# Patient Record
Sex: FEMALE | Race: WHITE | NOT HISPANIC OR LATINO | Employment: OTHER | URBAN - METROPOLITAN AREA
[De-identification: names, ages, dates, MRNs, and addresses within clinical notes are randomized per-mention and may not be internally consistent; named-entity substitution may affect disease eponyms.]

---

## 2023-03-13 ENCOUNTER — OFFICE VISIT (OUTPATIENT)
Dept: PRIMARY CARE | Facility: CLINIC | Age: 58
End: 2023-03-13
Payer: COMMERCIAL

## 2023-03-13 VITALS
TEMPERATURE: 97.2 F | OXYGEN SATURATION: 96 % | DIASTOLIC BLOOD PRESSURE: 76 MMHG | SYSTOLIC BLOOD PRESSURE: 122 MMHG | HEART RATE: 79 BPM

## 2023-03-13 DIAGNOSIS — J06.9 VIRAL UPPER RESPIRATORY TRACT INFECTION: Primary | ICD-10-CM

## 2023-03-13 PROCEDURE — 99213 OFFICE O/P EST LOW 20 MIN: CPT | Performed by: FAMILY MEDICINE

## 2023-03-13 PROCEDURE — 1036F TOBACCO NON-USER: CPT | Performed by: FAMILY MEDICINE

## 2023-03-13 PROCEDURE — 87635 SARS-COV-2 COVID-19 AMP PRB: CPT

## 2023-03-13 PROCEDURE — U0005 INFEC AGEN DETEC AMPLI PROBE: HCPCS

## 2023-03-13 RX ORDER — PREDNISONE 20 MG/1
40 TABLET ORAL DAILY
Qty: 10 TABLET | Refills: 0 | Status: SHIPPED | OUTPATIENT
Start: 2023-03-13 | End: 2023-03-18

## 2023-03-13 ASSESSMENT — ENCOUNTER SYMPTOMS
HEADACHES: 1
SORE THROAT: 1
FATIGUE: 1

## 2023-03-13 NOTE — PROGRESS NOTES
Subjective   Patient ID: Priya Hoover is a 57 y.o. female who presents for Sore Throat, Headache, Fatigue, and Nasal Congestion (X 4 days).  Sore Throat   Associated symptoms include headaches.   Headache   Associated symptoms include a sore throat.   Fatigue  Associated symptoms include fatigue, headaches and a sore throat.    patient presents with sore throat congestion drainage x4 days.  No fevers.  Has history of COVID in the past but none recently been vaccinated.  Use of Coricidin at home           Review of Systems   Constitutional:  Positive for fatigue.   HENT:  Positive for sore throat.    Neurological:  Positive for headaches.   All other systems reviewed and are negative.      Objective   Physical Exam  Vitals reviewed.   Constitutional:       General: She is not in acute distress.     Appearance: Normal appearance. She is well-developed. She is not diaphoretic.   HENT:      Head: Normocephalic and atraumatic.      Right Ear: Tympanic membrane normal.      Left Ear: Tympanic membrane normal.      Nose: Nose normal.      Mouth/Throat:      Mouth: Mucous membranes are moist.   Eyes:      Pupils: Pupils are equal, round, and reactive to light.   Cardiovascular:      Rate and Rhythm: Normal rate and regular rhythm.      Heart sounds: Normal heart sounds. No murmur heard.     No friction rub. No gallop.   Pulmonary:      Effort: Pulmonary effort is normal.      Breath sounds: Normal breath sounds. No rales.   Abdominal:      General: Bowel sounds are normal.      Palpations: Abdomen is soft.      Tenderness: There is no abdominal tenderness.   Musculoskeletal:      Cervical back: Normal range of motion and neck supple.   Skin:     General: Skin is warm and dry.   Neurological:      Mental Status: She is alert.   Psychiatric:         Mood and Affect: Mood normal.         No visits with results within 2 Month(s) from this visit.   Latest known visit with results is:   Legacy Encounter on 03/05/2021   Component  Date Value Ref Range Status    SARS-COV-2 RESULT 03/05/2021 NOT DETECTED  Not Detected Final    Comment: .  This assay is designed to detect the N, ORF1ab and/or S genes of SARS-CoV-2   via nucleic acid amplification.  A Negative (NOT DETECTED) result does not   preclude 2019-nCoV infection since the adequacy of sample collection and/or   low viral burden may result in presence of viral nucleic acids below the   clinical sensitivity of this test method.  Negative (NOT DETECTED) result   should not be used as the sole basis for treatment or other patient   management decisions. Rather negative results should be combined with   clinical observations, patient history, and epidemiological information to   make patient management decisions.    Fact sheet for providers: https://www.fda.gov/media/864783/download  Fact sheet for patients: https://www.fda.gov/media/542485/download    This test has received FDA Emergency Use Authorization (EUA) and has been   verified by [facility] ([facility]). This test   is only authorized for the duration of time that circumstances                            exist to   justify the authorization of the emergency use of in vitro diagnostic tests   for the detection of SARS-CoV-2 virus and/or diagnosis of COVID-19 infection   under section 564(b)(1) of the Act, 21 U.S.C. 360bbb-3(b)(1), unless the   authorization is terminated or revoked sooner.      [facility] is certified under CLIA-88 as   qualified to perform high complexity testing. Testing is performed in the   [facility] laboratories located at [address] [place] [address].      Date of Symptom Onset? (YYYYMMDD)? 03/05/2021 20,210,303   Final       Assessment/Plan   Problem List Items Addressed This Visit    None  Visit Diagnoses       Viral upper respiratory tract infection    -  Primary    Relevant Medications    predniSONE (Deltasone) 20 mg tablet    Other Relevant  Orders    Sars-CoV-2 PCR, Symptomatic        Sending a burst of prednisone for patient.  Can try Flonase as well.  We will send test for COVID.  If not feeling better on Thursday or Friday can call in for course of amoxicillin

## 2023-03-14 LAB — SARS-COV-2 RESULT: NOT DETECTED

## 2023-03-15 ENCOUNTER — TELEPHONE (OUTPATIENT)
Dept: PRIMARY CARE | Facility: CLINIC | Age: 58
End: 2023-03-15
Payer: COMMERCIAL

## 2023-03-16 ENCOUNTER — TELEPHONE (OUTPATIENT)
Dept: PRIMARY CARE | Facility: CLINIC | Age: 58
End: 2023-03-16
Payer: COMMERCIAL

## 2023-03-16 DIAGNOSIS — J01.00 ACUTE NON-RECURRENT MAXILLARY SINUSITIS: Primary | ICD-10-CM

## 2023-03-16 RX ORDER — AMOXICILLIN 500 MG/1
1000 TABLET, FILM COATED ORAL EVERY 12 HOURS SCHEDULED
Qty: 40 TABLET | Refills: 0 | Status: SHIPPED | OUTPATIENT
Start: 2023-03-16 | End: 2023-03-26

## 2023-03-16 NOTE — TELEPHONE ENCOUNTER
Patient states [facility] said if she is not feeling any better he would send in ABX for her. Patient states she has a bad stomach to watch what is sent in for her. She is using [facility]

## 2025-06-24 ENCOUNTER — ANESTHESIA EVENT (OUTPATIENT)
Dept: OPERATING ROOM | Facility: HOSPITAL | Age: 60
End: 2025-06-24
Payer: COMMERCIAL

## 2025-06-24 ENCOUNTER — APPOINTMENT (OUTPATIENT)
Dept: RADIOLOGY | Facility: HOSPITAL | Age: 60
End: 2025-06-24
Payer: COMMERCIAL

## 2025-06-24 ENCOUNTER — ANESTHESIA (OUTPATIENT)
Dept: OPERATING ROOM | Facility: HOSPITAL | Age: 60
End: 2025-06-24
Payer: COMMERCIAL

## 2025-06-24 ENCOUNTER — APPOINTMENT (OUTPATIENT)
Dept: CARDIOLOGY | Facility: HOSPITAL | Age: 60
End: 2025-06-24
Payer: COMMERCIAL

## 2025-06-24 ENCOUNTER — HOSPITAL ENCOUNTER (OUTPATIENT)
Facility: HOSPITAL | Age: 60
Setting detail: OBSERVATION
Discharge: HOME | End: 2025-06-26
Attending: STUDENT IN AN ORGANIZED HEALTH CARE EDUCATION/TRAINING PROGRAM | Admitting: SURGERY
Payer: COMMERCIAL

## 2025-06-24 DIAGNOSIS — K35.30 ACUTE APPENDICITIS WITH LOCALIZED PERITONITIS, WITHOUT PERFORATION, ABSCESS, OR GANGRENE: Primary | ICD-10-CM

## 2025-06-24 DIAGNOSIS — Z90.49 S/P LAPAROSCOPIC APPENDECTOMY: ICD-10-CM

## 2025-06-24 DIAGNOSIS — K35.80 ACUTE APPENDICITIS, UNSPECIFIED ACUTE APPENDICITIS TYPE: ICD-10-CM

## 2025-06-24 PROBLEM — K35.33 ACUTE APPENDICITIS WITH APPENDICEAL ABSCESS: Status: ACTIVE | Noted: 2025-06-24

## 2025-06-24 LAB
ALBUMIN SERPL BCP-MCNC: 4.8 G/DL (ref 3.4–5)
ALP SERPL-CCNC: 67 U/L (ref 33–110)
ALT SERPL W P-5'-P-CCNC: 25 U/L (ref 7–45)
ANION GAP SERPL CALC-SCNC: 11 MMOL/L (ref 10–20)
APPEARANCE UR: CLEAR
AST SERPL W P-5'-P-CCNC: 21 U/L (ref 9–39)
ATRIAL RATE: 76 BPM
BASOPHILS # BLD AUTO: 0.02 X10*3/UL (ref 0–0.1)
BASOPHILS NFR BLD AUTO: 0.1 %
BILIRUB SERPL-MCNC: 1 MG/DL (ref 0–1.2)
BILIRUB UR STRIP.AUTO-MCNC: NEGATIVE MG/DL
BUN SERPL-MCNC: 15 MG/DL (ref 6–23)
CALCIUM SERPL-MCNC: 9.5 MG/DL (ref 8.6–10.3)
CARDIAC TROPONIN I PNL SERPL HS: <3 NG/L (ref 0–13)
CARDIAC TROPONIN I PNL SERPL HS: <3 NG/L (ref 0–13)
CHLORIDE SERPL-SCNC: 100 MMOL/L (ref 98–107)
CO2 SERPL-SCNC: 29 MMOL/L (ref 21–32)
COLOR UR: ABNORMAL
CREAT SERPL-MCNC: 0.66 MG/DL (ref 0.5–1.05)
EGFRCR SERPLBLD CKD-EPI 2021: >90 ML/MIN/1.73M*2
EOSINOPHIL # BLD AUTO: 0 X10*3/UL (ref 0–0.7)
EOSINOPHIL NFR BLD AUTO: 0 %
ERYTHROCYTE [DISTWIDTH] IN BLOOD BY AUTOMATED COUNT: 12.2 % (ref 11.5–14.5)
GLUCOSE SERPL-MCNC: 128 MG/DL (ref 74–99)
GLUCOSE UR STRIP.AUTO-MCNC: NORMAL MG/DL
HCT VFR BLD AUTO: 44 % (ref 36–46)
HGB BLD-MCNC: 14.3 G/DL (ref 12–16)
IMM GRANULOCYTES # BLD AUTO: 0.04 X10*3/UL (ref 0–0.7)
IMM GRANULOCYTES NFR BLD AUTO: 0.3 % (ref 0–0.9)
KETONES UR STRIP.AUTO-MCNC: ABNORMAL MG/DL
LACTATE SERPL-SCNC: 1.3 MMOL/L (ref 0.4–2)
LEUKOCYTE ESTERASE UR QL STRIP.AUTO: NEGATIVE
LIPASE SERPL-CCNC: 15 U/L (ref 9–82)
LYMPHOCYTES # BLD AUTO: 0.96 X10*3/UL (ref 1.2–4.8)
LYMPHOCYTES NFR BLD AUTO: 6.9 %
MCH RBC QN AUTO: 28.8 PG (ref 26–34)
MCHC RBC AUTO-ENTMCNC: 32.5 G/DL (ref 32–36)
MCV RBC AUTO: 89 FL (ref 80–100)
MONOCYTES # BLD AUTO: 0.41 X10*3/UL (ref 0.1–1)
MONOCYTES NFR BLD AUTO: 2.9 %
NEUTROPHILS # BLD AUTO: 12.55 X10*3/UL (ref 1.2–7.7)
NEUTROPHILS NFR BLD AUTO: 89.8 %
NITRITE UR QL STRIP.AUTO: NEGATIVE
NRBC BLD-RTO: 0 /100 WBCS (ref 0–0)
P AXIS: 60 DEGREES
PH UR STRIP.AUTO: 7.5 [PH]
PLATELET # BLD AUTO: 278 X10*3/UL (ref 150–450)
POTASSIUM SERPL-SCNC: 3.9 MMOL/L (ref 3.5–5.3)
PR INTERVAL: 148 MS
PROT SERPL-MCNC: 7.5 G/DL (ref 6.4–8.2)
PROT UR STRIP.AUTO-MCNC: NEGATIVE MG/DL
Q ONSET: 252 MS
QRS COUNT: 12 BEATS
QRS DURATION: 107 MS
QT INTERVAL: 388 MS
QTC CALCULATION(BAZETT): 437 MS
QTC FREDERICIA: 419 MS
R AXIS: 72 DEGREES
RBC # BLD AUTO: 4.97 X10*6/UL (ref 4–5.2)
RBC # UR STRIP.AUTO: NEGATIVE MG/DL
SODIUM SERPL-SCNC: 136 MMOL/L (ref 136–145)
SP GR UR STRIP.AUTO: 1.04
T AXIS: 49 DEGREES
T OFFSET: 446 MS
UROBILINOGEN UR STRIP.AUTO-MCNC: NORMAL MG/DL
VENTRICULAR RATE: 76 BPM
WBC # BLD AUTO: 14 X10*3/UL (ref 4.4–11.3)

## 2025-06-24 PROCEDURE — 74177 CT ABD & PELVIS W/CONTRAST: CPT

## 2025-06-24 PROCEDURE — 71046 X-RAY EXAM CHEST 2 VIEWS: CPT

## 2025-06-24 PROCEDURE — 88304 TISSUE EXAM BY PATHOLOGIST: CPT | Performed by: PATHOLOGY

## 2025-06-24 PROCEDURE — 36415 COLL VENOUS BLD VENIPUNCTURE: CPT | Performed by: NURSE PRACTITIONER

## 2025-06-24 PROCEDURE — 84484 ASSAY OF TROPONIN QUANT: CPT | Performed by: NURSE PRACTITIONER

## 2025-06-24 PROCEDURE — 96375 TX/PRO/DX INJ NEW DRUG ADDON: CPT | Mod: 59

## 2025-06-24 PROCEDURE — A9698 NON-RAD CONTRAST MATERIALNOC: HCPCS | Performed by: STUDENT IN AN ORGANIZED HEALTH CARE EDUCATION/TRAINING PROGRAM

## 2025-06-24 PROCEDURE — 96372 THER/PROPH/DIAG INJ SC/IM: CPT | Performed by: SURGERY

## 2025-06-24 PROCEDURE — 2500000004 HC RX 250 GENERAL PHARMACY W/ HCPCS (ALT 636 FOR OP/ED): Performed by: NURSE ANESTHETIST, CERTIFIED REGISTERED

## 2025-06-24 PROCEDURE — 2550000001 HC RX 255 CONTRASTS: Performed by: NURSE PRACTITIONER

## 2025-06-24 PROCEDURE — 76705 ECHO EXAM OF ABDOMEN: CPT | Performed by: RADIOLOGY

## 2025-06-24 PROCEDURE — 2500000004 HC RX 250 GENERAL PHARMACY W/ HCPCS (ALT 636 FOR OP/ED): Performed by: SURGERY

## 2025-06-24 PROCEDURE — 74177 CT ABD & PELVIS W/CONTRAST: CPT | Performed by: RADIOLOGY

## 2025-06-24 PROCEDURE — 3700000002 HC GENERAL ANESTHESIA TIME - EACH INCREMENTAL 1 MINUTE: Performed by: SURGERY

## 2025-06-24 PROCEDURE — 76705 ECHO EXAM OF ABDOMEN: CPT

## 2025-06-24 PROCEDURE — 0752T DGTZ GLS MCRSCP SLD LVL III: CPT | Mod: TC,ELYLAB,PORLAB | Performed by: SURGERY

## 2025-06-24 PROCEDURE — 7100000002 HC RECOVERY ROOM TIME - EACH INCREMENTAL 1 MINUTE: Performed by: SURGERY

## 2025-06-24 PROCEDURE — 96361 HYDRATE IV INFUSION ADD-ON: CPT | Mod: 59

## 2025-06-24 PROCEDURE — 84075 ASSAY ALKALINE PHOSPHATASE: CPT | Performed by: NURSE PRACTITIONER

## 2025-06-24 PROCEDURE — 3700000001 HC GENERAL ANESTHESIA TIME - INITIAL BASE CHARGE: Performed by: SURGERY

## 2025-06-24 PROCEDURE — 2720000007 HC OR 272 NO HCPCS: Performed by: SURGERY

## 2025-06-24 PROCEDURE — 83690 ASSAY OF LIPASE: CPT | Performed by: NURSE PRACTITIONER

## 2025-06-24 PROCEDURE — 83605 ASSAY OF LACTIC ACID: CPT | Performed by: NURSE PRACTITIONER

## 2025-06-24 PROCEDURE — 3600000003 HC OR TIME - INITIAL BASE CHARGE - PROCEDURE LEVEL THREE: Performed by: SURGERY

## 2025-06-24 PROCEDURE — 93005 ELECTROCARDIOGRAM TRACING: CPT

## 2025-06-24 PROCEDURE — 3600000008 HC OR TIME - EACH INCREMENTAL 1 MINUTE - PROCEDURE LEVEL THREE: Performed by: SURGERY

## 2025-06-24 PROCEDURE — 2550000001 HC RX 255 CONTRASTS: Performed by: STUDENT IN AN ORGANIZED HEALTH CARE EDUCATION/TRAINING PROGRAM

## 2025-06-24 PROCEDURE — 96365 THER/PROPH/DIAG IV INF INIT: CPT | Mod: 59

## 2025-06-24 PROCEDURE — 2500000004 HC RX 250 GENERAL PHARMACY W/ HCPCS (ALT 636 FOR OP/ED): Performed by: NURSE PRACTITIONER

## 2025-06-24 PROCEDURE — 81003 URINALYSIS AUTO W/O SCOPE: CPT | Performed by: NURSE PRACTITIONER

## 2025-06-24 PROCEDURE — 99285 EMERGENCY DEPT VISIT HI MDM: CPT | Mod: 25 | Performed by: STUDENT IN AN ORGANIZED HEALTH CARE EDUCATION/TRAINING PROGRAM

## 2025-06-24 PROCEDURE — 85025 COMPLETE CBC W/AUTO DIFF WBC: CPT | Performed by: NURSE PRACTITIONER

## 2025-06-24 PROCEDURE — 71046 X-RAY EXAM CHEST 2 VIEWS: CPT | Performed by: RADIOLOGY

## 2025-06-24 PROCEDURE — 7100000001 HC RECOVERY ROOM TIME - INITIAL BASE CHARGE: Performed by: SURGERY

## 2025-06-24 RX ORDER — HYDRALAZINE HYDROCHLORIDE 20 MG/ML
5 INJECTION INTRAMUSCULAR; INTRAVENOUS EVERY 30 MIN PRN
Status: CANCELLED | OUTPATIENT
Start: 2025-06-24

## 2025-06-24 RX ORDER — ENOXAPARIN SODIUM 100 MG/ML
40 INJECTION SUBCUTANEOUS EVERY 24 HOURS
Status: DISCONTINUED | OUTPATIENT
Start: 2025-06-24 | End: 2025-06-26 | Stop reason: HOSPADM

## 2025-06-24 RX ORDER — LIDOCAINE HYDROCHLORIDE 10 MG/ML
0.1 INJECTION, SOLUTION EPIDURAL; INFILTRATION; INTRACAUDAL; PERINEURAL ONCE
Status: CANCELLED | OUTPATIENT
Start: 2025-06-24 | End: 2025-06-24

## 2025-06-24 RX ORDER — MORPHINE SULFATE 4 MG/ML
4 INJECTION INTRAVENOUS ONCE
Status: COMPLETED | OUTPATIENT
Start: 2025-06-24 | End: 2025-06-24

## 2025-06-24 RX ORDER — ONDANSETRON HYDROCHLORIDE 2 MG/ML
4 INJECTION, SOLUTION INTRAVENOUS ONCE AS NEEDED
Status: CANCELLED | OUTPATIENT
Start: 2025-06-24

## 2025-06-24 RX ORDER — ALBUTEROL SULFATE 0.83 MG/ML
2.5 SOLUTION RESPIRATORY (INHALATION) ONCE AS NEEDED
Status: CANCELLED | OUTPATIENT
Start: 2025-06-24

## 2025-06-24 RX ORDER — KETOROLAC TROMETHAMINE 30 MG/ML
INJECTION, SOLUTION INTRAMUSCULAR; INTRAVENOUS AS NEEDED
Status: DISCONTINUED | OUTPATIENT
Start: 2025-06-24 | End: 2025-06-24

## 2025-06-24 RX ORDER — LABETALOL HYDROCHLORIDE 5 MG/ML
5 INJECTION, SOLUTION INTRAVENOUS ONCE AS NEEDED
Status: CANCELLED | OUTPATIENT
Start: 2025-06-24

## 2025-06-24 RX ORDER — MORPHINE SULFATE 2 MG/ML
2 INJECTION, SOLUTION INTRAMUSCULAR; INTRAVENOUS EVERY 5 MIN PRN
Status: CANCELLED | OUTPATIENT
Start: 2025-06-24

## 2025-06-24 RX ORDER — DIPHENHYDRAMINE HYDROCHLORIDE 50 MG/ML
12.5 INJECTION, SOLUTION INTRAMUSCULAR; INTRAVENOUS ONCE AS NEEDED
Status: CANCELLED | OUTPATIENT
Start: 2025-06-24

## 2025-06-24 RX ORDER — PROMETHAZINE HYDROCHLORIDE 25 MG/1
25 TABLET ORAL EVERY 6 HOURS PRN
Status: DISCONTINUED | OUTPATIENT
Start: 2025-06-24 | End: 2025-06-26 | Stop reason: HOSPADM

## 2025-06-24 RX ORDER — ONDANSETRON 4 MG/1
4 TABLET, FILM COATED ORAL EVERY 8 HOURS PRN
Status: DISCONTINUED | OUTPATIENT
Start: 2025-06-24 | End: 2025-06-26 | Stop reason: HOSPADM

## 2025-06-24 RX ORDER — ACETAMINOPHEN 325 MG/1
650 TABLET ORAL EVERY 4 HOURS PRN
Status: DISCONTINUED | OUTPATIENT
Start: 2025-06-24 | End: 2025-06-26 | Stop reason: HOSPADM

## 2025-06-24 RX ORDER — MORPHINE SULFATE 10 MG/ML
2 INJECTION, SOLUTION INTRAMUSCULAR; INTRAVENOUS EVERY 4 HOURS PRN
Status: DISCONTINUED | OUTPATIENT
Start: 2025-06-24 | End: 2025-06-26 | Stop reason: HOSPADM

## 2025-06-24 RX ORDER — ONDANSETRON HYDROCHLORIDE 2 MG/ML
INJECTION, SOLUTION INTRAVENOUS AS NEEDED
Status: DISCONTINUED | OUTPATIENT
Start: 2025-06-24 | End: 2025-06-24

## 2025-06-24 RX ORDER — DEXTROSE, SODIUM CHLORIDE, SODIUM LACTATE, POTASSIUM CHLORIDE, AND CALCIUM CHLORIDE 5; .6; .31; .03; .02 G/100ML; G/100ML; G/100ML; G/100ML; G/100ML
100 INJECTION, SOLUTION INTRAVENOUS CONTINUOUS
Status: ACTIVE | OUTPATIENT
Start: 2025-06-24 | End: 2025-06-25

## 2025-06-24 RX ORDER — KETOROLAC TROMETHAMINE 30 MG/ML
30 INJECTION, SOLUTION INTRAMUSCULAR; INTRAVENOUS ONCE
Status: COMPLETED | OUTPATIENT
Start: 2025-06-24 | End: 2025-06-24

## 2025-06-24 RX ORDER — MEPERIDINE HYDROCHLORIDE 25 MG/ML
12.5 INJECTION INTRAMUSCULAR; INTRAVENOUS; SUBCUTANEOUS EVERY 10 MIN PRN
Status: CANCELLED | OUTPATIENT
Start: 2025-06-24

## 2025-06-24 RX ORDER — PROPOFOL 10 MG/ML
INJECTION, EMULSION INTRAVENOUS AS NEEDED
Status: DISCONTINUED | OUTPATIENT
Start: 2025-06-24 | End: 2025-06-24

## 2025-06-24 RX ORDER — PANTOPRAZOLE SODIUM 40 MG/1
40 TABLET, DELAYED RELEASE ORAL
Status: DISCONTINUED | OUTPATIENT
Start: 2025-06-25 | End: 2025-06-26 | Stop reason: HOSPADM

## 2025-06-24 RX ORDER — SODIUM CHLORIDE, SODIUM LACTATE, POTASSIUM CHLORIDE, CALCIUM CHLORIDE 600; 310; 30; 20 MG/100ML; MG/100ML; MG/100ML; MG/100ML
75 INJECTION, SOLUTION INTRAVENOUS CONTINUOUS
Status: CANCELLED | OUTPATIENT
Start: 2025-06-24 | End: 2025-06-25

## 2025-06-24 RX ORDER — ONDANSETRON HYDROCHLORIDE 2 MG/ML
4 INJECTION, SOLUTION INTRAVENOUS ONCE
Status: COMPLETED | OUTPATIENT
Start: 2025-06-24 | End: 2025-06-24

## 2025-06-24 RX ORDER — SUCCINYLCHOLINE CHLORIDE 20 MG/ML
INJECTION INTRAMUSCULAR; INTRAVENOUS AS NEEDED
Status: DISCONTINUED | OUTPATIENT
Start: 2025-06-24 | End: 2025-06-24

## 2025-06-24 RX ORDER — OXYCODONE AND ACETAMINOPHEN 5; 325 MG/1; MG/1
1 TABLET ORAL EVERY 4 HOURS PRN
Refills: 0 | Status: CANCELLED | OUTPATIENT
Start: 2025-06-24

## 2025-06-24 RX ORDER — DROPERIDOL 2.5 MG/ML
0.62 INJECTION, SOLUTION INTRAMUSCULAR; INTRAVENOUS ONCE AS NEEDED
Status: CANCELLED | OUTPATIENT
Start: 2025-06-24

## 2025-06-24 RX ORDER — BUPIVACAINE HYDROCHLORIDE 2.5 MG/ML
INJECTION, SOLUTION INFILTRATION; PERINEURAL AS NEEDED
Status: DISCONTINUED | OUTPATIENT
Start: 2025-06-24 | End: 2025-06-24 | Stop reason: HOSPADM

## 2025-06-24 RX ORDER — FAMOTIDINE 10 MG/ML
20 INJECTION, SOLUTION INTRAVENOUS ONCE
Status: CANCELLED | OUTPATIENT
Start: 2025-06-24 | End: 2025-06-24

## 2025-06-24 RX ORDER — ROCURONIUM BROMIDE 10 MG/ML
INJECTION, SOLUTION INTRAVENOUS AS NEEDED
Status: DISCONTINUED | OUTPATIENT
Start: 2025-06-24 | End: 2025-06-24

## 2025-06-24 RX ORDER — SODIUM CHLORIDE, SODIUM LACTATE, POTASSIUM CHLORIDE, CALCIUM CHLORIDE 600; 310; 30; 20 MG/100ML; MG/100ML; MG/100ML; MG/100ML
100 INJECTION, SOLUTION INTRAVENOUS CONTINUOUS
Status: CANCELLED | OUTPATIENT
Start: 2025-06-24 | End: 2025-06-25

## 2025-06-24 RX ORDER — PROMETHAZINE HYDROCHLORIDE 25 MG/1
25 SUPPOSITORY RECTAL EVERY 12 HOURS PRN
Status: DISCONTINUED | OUTPATIENT
Start: 2025-06-24 | End: 2025-06-26 | Stop reason: HOSPADM

## 2025-06-24 RX ORDER — OXYCODONE AND ACETAMINOPHEN 5; 325 MG/1; MG/1
1 TABLET ORAL EVERY 6 HOURS PRN
Refills: 0 | Status: DISCONTINUED | OUTPATIENT
Start: 2025-06-24 | End: 2025-06-26 | Stop reason: HOSPADM

## 2025-06-24 RX ORDER — ACETAMINOPHEN 650 MG/1
650 SUPPOSITORY RECTAL EVERY 4 HOURS PRN
Status: DISCONTINUED | OUTPATIENT
Start: 2025-06-24 | End: 2025-06-26 | Stop reason: HOSPADM

## 2025-06-24 RX ORDER — PANTOPRAZOLE SODIUM 40 MG/10ML
40 INJECTION, POWDER, LYOPHILIZED, FOR SOLUTION INTRAVENOUS
Status: DISCONTINUED | OUTPATIENT
Start: 2025-06-25 | End: 2025-06-26 | Stop reason: HOSPADM

## 2025-06-24 RX ORDER — POLYETHYLENE GLYCOL 3350 17 G/17G
17 POWDER, FOR SOLUTION ORAL DAILY
Status: DISCONTINUED | OUTPATIENT
Start: 2025-06-25 | End: 2025-06-26 | Stop reason: HOSPADM

## 2025-06-24 RX ORDER — ONDANSETRON HYDROCHLORIDE 2 MG/ML
4 INJECTION, SOLUTION INTRAVENOUS EVERY 8 HOURS PRN
Status: DISCONTINUED | OUTPATIENT
Start: 2025-06-24 | End: 2025-06-26 | Stop reason: HOSPADM

## 2025-06-24 RX ORDER — ACETAMINOPHEN 160 MG/5ML
650 SOLUTION ORAL EVERY 4 HOURS PRN
Status: DISCONTINUED | OUTPATIENT
Start: 2025-06-24 | End: 2025-06-26 | Stop reason: HOSPADM

## 2025-06-24 RX ADMIN — KETOROLAC TROMETHAMINE 30 MG: 30 INJECTION, SOLUTION INTRAMUSCULAR at 10:46

## 2025-06-24 RX ADMIN — SODIUM CHLORIDE 1000 ML: 0.9 INJECTION, SOLUTION INTRAVENOUS at 10:42

## 2025-06-24 RX ADMIN — IOHEXOL 75 ML: 350 INJECTION, SOLUTION INTRAVENOUS at 11:39

## 2025-06-24 RX ADMIN — ONDANSETRON 8 MG: 2 INJECTION, SOLUTION INTRAMUSCULAR; INTRAVENOUS at 21:08

## 2025-06-24 RX ADMIN — MORPHINE SULFATE 4 MG: 4 INJECTION INTRAVENOUS at 14:21

## 2025-06-24 RX ADMIN — IOHEXOL 75 ML: 350 INJECTION, SOLUTION INTRAVENOUS at 18:17

## 2025-06-24 RX ADMIN — DEXTROSE, SODIUM CHLORIDE, SODIUM LACTATE, POTASSIUM CHLORIDE, AND CALCIUM CHLORIDE 100 ML/HR: 5; .6; .31; .03; .02 INJECTION, SOLUTION INTRAVENOUS at 23:59

## 2025-06-24 RX ADMIN — IOHEXOL 500 ML: 12 SOLUTION ORAL at 15:39

## 2025-06-24 RX ADMIN — DEXAMETHASONE SODIUM PHOSPHATE 4 MG: 4 INJECTION INTRA-ARTICULAR; INTRALESIONAL; INTRAMUSCULAR; INTRAVENOUS; SOFT TISSUE at 20:29

## 2025-06-24 RX ADMIN — SUCCINYLCHOLINE CHLORIDE 100 MG: 20 INJECTION, SOLUTION INTRAMUSCULAR; INTRAVENOUS at 20:29

## 2025-06-24 RX ADMIN — ONDANSETRON 4 MG: 2 INJECTION, SOLUTION INTRAMUSCULAR; INTRAVENOUS at 10:46

## 2025-06-24 RX ADMIN — SODIUM CHLORIDE: 9 INJECTION, SOLUTION INTRAVENOUS at 20:23

## 2025-06-24 RX ADMIN — ENOXAPARIN SODIUM 40 MG: 100 INJECTION SUBCUTANEOUS at 23:59

## 2025-06-24 RX ADMIN — ROCURONIUM BROMIDE 50 MG: 10 INJECTION, SOLUTION INTRAVENOUS at 20:36

## 2025-06-24 RX ADMIN — PIPERACILLIN SODIUM AND TAZOBACTAM SODIUM 3.38 G: 3; .375 INJECTION, SOLUTION INTRAVENOUS at 19:40

## 2025-06-24 RX ADMIN — PROPOFOL 200 MG: 10 INJECTION, EMULSION INTRAVENOUS at 20:29

## 2025-06-24 RX ADMIN — SUGAMMADEX 150 MG: 100 INJECTION, SOLUTION INTRAVENOUS at 21:25

## 2025-06-24 RX ADMIN — KETOROLAC TROMETHAMINE 30 MG: 30 INJECTION, SOLUTION INTRAMUSCULAR; INTRAVENOUS at 21:08

## 2025-06-24 SDOH — SOCIAL STABILITY: SOCIAL INSECURITY: WERE YOU ABLE TO COMPLETE ALL THE BEHAVIORAL HEALTH SCREENINGS?: YES

## 2025-06-24 SDOH — SOCIAL STABILITY: SOCIAL INSECURITY
WITHIN THE LAST YEAR, HAVE YOU BEEN KICKED, HIT, SLAPPED, OR OTHERWISE PHYSICALLY HURT BY YOUR PARTNER OR EX-PARTNER?: NO

## 2025-06-24 SDOH — SOCIAL STABILITY: SOCIAL INSECURITY: WITHIN THE LAST YEAR, HAVE YOU BEEN AFRAID OF YOUR PARTNER OR EX-PARTNER?: NO

## 2025-06-24 SDOH — SOCIAL STABILITY: SOCIAL INSECURITY: WITHIN THE LAST YEAR, HAVE YOU BEEN HUMILIATED OR EMOTIONALLY ABUSED IN OTHER WAYS BY YOUR PARTNER OR EX-PARTNER?: NO

## 2025-06-24 SDOH — ECONOMIC STABILITY: FOOD INSECURITY: WITHIN THE PAST 12 MONTHS, YOU WORRIED THAT YOUR FOOD WOULD RUN OUT BEFORE YOU GOT THE MONEY TO BUY MORE.: NEVER TRUE

## 2025-06-24 SDOH — ECONOMIC STABILITY: INCOME INSECURITY: IN THE PAST 12 MONTHS HAS THE ELECTRIC, GAS, OIL, OR WATER COMPANY THREATENED TO SHUT OFF SERVICES IN YOUR HOME?: NO

## 2025-06-24 SDOH — ECONOMIC STABILITY: FOOD INSECURITY: WITHIN THE PAST 12 MONTHS, THE FOOD YOU BOUGHT JUST DIDN'T LAST AND YOU DIDN'T HAVE MONEY TO GET MORE.: NEVER TRUE

## 2025-06-24 SDOH — SOCIAL STABILITY: SOCIAL INSECURITY: HAVE YOU HAD THOUGHTS OF HARMING ANYONE ELSE?: NO

## 2025-06-24 SDOH — ECONOMIC STABILITY: HOUSING INSECURITY: AT ANY TIME IN THE PAST 12 MONTHS, WERE YOU HOMELESS OR LIVING IN A SHELTER (INCLUDING NOW)?: NO

## 2025-06-24 SDOH — ECONOMIC STABILITY: HOUSING INSECURITY: IN THE LAST 12 MONTHS, WAS THERE A TIME WHEN YOU WERE NOT ABLE TO PAY THE MORTGAGE OR RENT ON TIME?: NO

## 2025-06-24 SDOH — SOCIAL STABILITY: SOCIAL INSECURITY: ABUSE: ADULT

## 2025-06-24 SDOH — ECONOMIC STABILITY: TRANSPORTATION INSECURITY: IN THE PAST 12 MONTHS, HAS LACK OF TRANSPORTATION KEPT YOU FROM MEDICAL APPOINTMENTS OR FROM GETTING MEDICATIONS?: NO

## 2025-06-24 SDOH — ECONOMIC STABILITY: FOOD INSECURITY: HOW HARD IS IT FOR YOU TO PAY FOR THE VERY BASICS LIKE FOOD, HOUSING, MEDICAL CARE, AND HEATING?: NOT HARD AT ALL

## 2025-06-24 SDOH — SOCIAL STABILITY: SOCIAL INSECURITY
WITHIN THE LAST YEAR, HAVE YOU BEEN RAPED OR FORCED TO HAVE ANY KIND OF SEXUAL ACTIVITY BY YOUR PARTNER OR EX-PARTNER?: NO

## 2025-06-24 SDOH — HEALTH STABILITY: MENTAL HEALTH: CURRENT SMOKER: 0

## 2025-06-24 SDOH — ECONOMIC STABILITY: HOUSING INSECURITY: IN THE PAST 12 MONTHS, HOW MANY TIMES HAVE YOU MOVED WHERE YOU WERE LIVING?: 0

## 2025-06-24 ASSESSMENT — COGNITIVE AND FUNCTIONAL STATUS - GENERAL
DAILY ACTIVITIY SCORE: 24
MOBILITY SCORE: 24
MOBILITY SCORE: 24
DAILY ACTIVITIY SCORE: 24
PATIENT BASELINE BEDBOUND: NO

## 2025-06-24 ASSESSMENT — PAIN SCALES - GENERAL
PAINLEVEL_OUTOF10: 10 - WORST POSSIBLE PAIN
PAINLEVEL_OUTOF10: 10 - WORST POSSIBLE PAIN
PAINLEVEL_OUTOF10: 0 - NO PAIN
PAIN_LEVEL: 2
PAINLEVEL_OUTOF10: 0 - NO PAIN
PAINLEVEL_OUTOF10: 0 - NO PAIN
PAINLEVEL_OUTOF10: 10 - WORST POSSIBLE PAIN
PAINLEVEL_OUTOF10: 5 - MODERATE PAIN
PAINLEVEL_OUTOF10: 0 - NO PAIN

## 2025-06-24 ASSESSMENT — LIFESTYLE VARIABLES
HOW OFTEN DO YOU HAVE 6 OR MORE DRINKS ON ONE OCCASION: NEVER
EVER HAD A DRINK FIRST THING IN THE MORNING TO STEADY YOUR NERVES TO GET RID OF A HANGOVER: NO
TOTAL SCORE: 0
HOW MANY STANDARD DRINKS CONTAINING ALCOHOL DO YOU HAVE ON A TYPICAL DAY: PATIENT DOES NOT DRINK
HAVE PEOPLE ANNOYED YOU BY CRITICIZING YOUR DRINKING: NO
EVER FELT BAD OR GUILTY ABOUT YOUR DRINKING: NO
SKIP TO QUESTIONS 9-10: 1
HAVE YOU EVER FELT YOU SHOULD CUT DOWN ON YOUR DRINKING: NO
HOW OFTEN DO YOU HAVE A DRINK CONTAINING ALCOHOL: NEVER
AUDIT-C TOTAL SCORE: 0
AUDIT-C TOTAL SCORE: 0

## 2025-06-24 ASSESSMENT — ACTIVITIES OF DAILY LIVING (ADL)
GROOMING: INDEPENDENT
LACK_OF_TRANSPORTATION: NO
DRESSING YOURSELF: INDEPENDENT
PATIENT'S MEMORY ADEQUATE TO SAFELY COMPLETE DAILY ACTIVITIES?: YES
HEARING - RIGHT EAR: FUNCTIONAL
JUDGMENT_ADEQUATE_SAFELY_COMPLETE_DAILY_ACTIVITIES: YES
TOILETING: INDEPENDENT
ADEQUATE_TO_COMPLETE_ADL: YES
HEARING - LEFT EAR: FUNCTIONAL
WALKS IN HOME: INDEPENDENT
FEEDING YOURSELF: INDEPENDENT
BATHING: INDEPENDENT

## 2025-06-24 ASSESSMENT — PATIENT HEALTH QUESTIONNAIRE - PHQ9
2. FEELING DOWN, DEPRESSED OR HOPELESS: NOT AT ALL
1. LITTLE INTEREST OR PLEASURE IN DOING THINGS: NOT AT ALL
SUM OF ALL RESPONSES TO PHQ9 QUESTIONS 1 & 2: 0

## 2025-06-24 ASSESSMENT — ENCOUNTER SYMPTOMS
SHORTNESS OF BREATH: 0
PALPITATIONS: 0
WHEEZING: 0
ABDOMINAL DISTENTION: 1
FEVER: 0
CONSTIPATION: 0
DYSURIA: 1
ACTIVITY CHANGE: 0
DIFFICULTY URINATING: 0
LIGHT-HEADEDNESS: 0
VOMITING: 0
ABDOMINAL PAIN: 1
NAUSEA: 1
DIARRHEA: 1
APPETITE CHANGE: 0
DIAPHORESIS: 1
CHILLS: 0
CHEST TIGHTNESS: 0
HEMATURIA: 0
DIZZINESS: 0

## 2025-06-24 ASSESSMENT — HEART SCORE
ECG: NORMAL
AGE: 45-64
HEART SCORE: 1
HISTORY: SLIGHTLY SUSPICIOUS
TROPONIN: LESS THAN OR EQUAL TO NORMAL LIMIT
RISK FACTORS: NO KNOWN RISK FACTORS

## 2025-06-24 ASSESSMENT — PAIN - FUNCTIONAL ASSESSMENT
PAIN_FUNCTIONAL_ASSESSMENT: 0-10

## 2025-06-24 NOTE — CONSULTS
Reason For Consult  C/f colitis vs appendicitis    History Of Present Illness  Priya Hoover is a 59 y.o. female with no significant PMH presenting with lower abdominal pain which started around 12-1 am today. She reports the pain coming on suddenly with associated nausea, diarrhea, and cramping. She had a period of diaphoresis but does not know whether she had a fever at the time. She ate dinner last night but has only had a small amount of juice today. When seen, she reports significant improvement to her pain and reports that it's focally worse in the suprapubic region but spans across the entire lower abdomen currently rated as a 4/10. When seen by surgery, she had just received a dose of morphine. She states the pain in crampy in nature. She reports at least 4-5 loose stools since the pain began.  CT imaging is concerning for a dilated appendix to 14 mm with diffuse colitis. Patient is here with her  Tao.      Past Medical History  She has no past medical history on file.    Surgical History  She has no past surgical history on file.     Social History  She reports that she has never smoked. She has never used smokeless tobacco. No history on file for alcohol use and drug use.    Family History  Family History[1]     Allergies  Sulfamethoxazole-trimethoprim    Review of Systems  Review of Systems   Constitutional:  Positive for diaphoresis. Negative for activity change, appetite change, chills and fever.   Respiratory:  Negative for chest tightness, shortness of breath and wheezing.    Cardiovascular:  Negative for chest pain, palpitations and leg swelling.   Gastrointestinal:  Positive for abdominal distention, abdominal pain, diarrhea and nausea. Negative for constipation and vomiting.   Genitourinary:  Positive for dysuria. Negative for difficulty urinating and hematuria.   Neurological:  Negative for dizziness and light-headedness.         Physical Exam  General: NAD, awake and alert, conversive  "  HEENT: Normocephalic, atraumatic  Neuro: A&O x 4, grossly intact  Cardio: Regular rate  Respiratory: Unlabored breathing on RA  Abdomen: Soft, mild distention, tenderness to palpation in lower quadrants - worse in suprapubic region - no rebound or guarding  Extremities: Normal extremities, no edema or cyanosis  Skin: Warm and dry  Psych: Appropriate mood and behavior     Last Recorded Vitals  Blood pressure 124/74, pulse 65, temperature 36.7 °C (98.1 °F), resp. rate 16, height 1.499 m (4' 11\"), weight 52.6 kg (116 lb), SpO2 99%.    Relevant Results  US gallbladder  Result Date: 6/24/2025  Interpreted By:  Orlando Bird, STUDY: US GALLBLADDER;  6/24/2025 11:47 am   INDICATION: Signs/Symptoms:RUQ pain.   COMPARISON: None.   ACCESSION NUMBER(S): [id number]   ORDERING CLINICIAN: DANTE HIRSCH   TECHNIQUE: Multiple images of the right upper quadrant were obtained.  Gray scale, color Doppler and spectral Doppler waveform analysis was performed.   FINDINGS: LIVER: 11.7 cm in length. No focal abnormality. Normal hepatic echogenicity.   GALLBLADDER: No gallstones. No gallbladder wall thickening. Negative sonographic Handy's sign.   BILIARY SYSTEM: Nondilated.   PANCREAS: Visualized portion of the body unremarkable. Remainder obscured by overlying bowel gas.   RIGHT KIDNEY: 10.2 cm in length. No hydronephrosis. No focal renal abnormality.       Negative right upper quadrant ultrasound.   Signed by: Orlando Bird 6/24/2025 12:07 PM Dictation workstation:   [id number]    CT abdomen pelvis w IV contrast  Result Date: 6/24/2025  Interpreted By:  Orlando Bird, STUDY: CT ABDOMEN PELVIS W IV CONTRAST;  6/24/2025 11:51 am   INDICATION: 60 y/o   F with  Signs/Symptoms:abdominal pain.   LIMITATIONS: None.   ACCESSION NUMBER(S): [id number]   ORDERING CLINICIAN: DANTE HIRSCH   TECHNIQUE: After the administration of IV iodonated contrast, spiral axial images were obtained from the xiphoid down through the " symphysis pubis. Sagittal and coronal reconstruction images were generated. 75 mL of Omnipaque 350.   COMPARISON: None.   FINDINGS: Lower Chest: Streaky left basilar atelectasis. Mild distal esophageal thickening near the level of the GE junction, correlate clinically for esophagitis.   Liver: The liver is unremarkable without focal lesion.   Gallbladder and Biliary: Unremarkable.   Pancreas: No abnormality identified in the pancreas.   Spleen: No abnormality identified in the spleen.   Adrenals: No abnormality identified in either adrenal gland.   Urinary: No parenchymal abnormality identified in either kidney. No hydronephrosis.   Gastrointestinal/Peritoneum: No small or large bowel obstruction in the visualized abdomen. In the abdomen, there is no extraluminal air. No significant free fluid. Mild diffuse bowel wall thickening in the colon. There is a structure along the posterior aspect of the cecum distinct from the terminal ileum, which may represent an enlarged appendix, measuring 14 mm in diameter. Gas and stool are seen throughout the structure and there is no wall thickening or adjacent stranding, however this would be considered abnormally enlarged for an appendix.   Vascular: Abdominal aorta is normal in caliber.   Lymphatics: No enlarged lymph nodes by size criteria.   MSK/Body Wall: No aggressive bony lesion identified.       Diffuse bowel wall thickening in the colon suggestive of colitis.   Mild distal esophageal thickening near the level of the GE junction, correlate clinically for esophagitis.   Possibly dilated appendix. Recommend repeating examination with positive oral contrast to exclude early appendicitis.   Signed by: Orlando Bird 6/24/2025 12:07 PM Dictation workstation:   [id number]    XR chest 2 views  Result Date: 6/24/2025  Interpreted By:  Aguila High, STUDY: XR CHEST 2 VIEWS; 6/24/2025 11:11 am   INDICATION: Signs/Symptoms:RUQ pain   COMPARISON: None.   ACCESSION NUMBER(S):  [id number]   ORDERING CLINICIAN: DANTE HIRSCH   TECHNIQUE: Number of films: Two-view radiographs of the chest were obtained.   FINDINGS: The heart and mediastinum are normal. The lungs are clear. No pleural effusions are seen. The osseous structures are unremarkable.       Normal chest radiographs.   Signed by: Aguila High 6/24/2025 11:32 AM Dictation workstation:   [id number]    ECG 12 lead  Result Date: 6/24/2025  Sinus rhythm RSR' in V1 or V2, right VCD or RVH         Assessment/Plan     Ms. Priya Hoover is a 68 y/o female presenting with acute onset lower abdominal pain and CT findings concerning for appendicitis with associated colitis. Radiologist suggests repeat imaging with PO contrast to better visualize the appendix as there is no associated wall thickening or stranding around the structure. Patient with WBC of 14 but otherwise labs unremarkable.     Plan:  - Repeat CT imaging with PO contrast of the abdomen and pelvis  - Dispo pending repeat imaging   - Continue NPO for now with fluids per ED    Patient discussed with attending, Dr. Linda Nuñez MD         [1]   Family History  Problem Relation Name Age of Onset    Hypertension Mother      Hyperlipidemia Mother      Uterine cancer Mother      Hypertension Father      Hyperlipidemia Father      Lung cancer Father      Liver cancer Father      Bone cancer Father      Coronary artery disease Mother's Brother      Aneurysm Mother's Brother  37    Diabetes type II Father's Brother      Coronary artery disease Father's Brother

## 2025-06-24 NOTE — ED PROVIDER NOTES
Chief Complaint   Patient presents with    Abdominal Pain    Nausea    Vomiting    Diarrhea       HPI       59 year old female presents to the Emergency Department today complaining of RUQ abdominal pain that she describes as sharp in nature, constant since midnight, radiates throughout her abdomen, and varies in intensity. Notes to having nausea with diarrhea associated with the above. Denies any associated fever, chills, headache, neck pain, chest pain, shortness of breath, vomiting, constipation, hematemesis, hematochezia, melena, or urinary symptoms.       History provided by:  Patient             Patient History   Medical History[1]  Surgical History[2]  Family History[3]  Social History[4]        Physical Exam  Constitutional:       Appearance: Normal appearance.   HENT:      Head: Normocephalic.      Right Ear: Tympanic membrane, ear canal and external ear normal.      Left Ear: Tympanic membrane, ear canal and external ear normal.      Nose: Nose normal.      Mouth/Throat:      Mouth: Mucous membranes are moist.      Pharynx: Oropharynx is clear. No oropharyngeal exudate or posterior oropharyngeal erythema.   Eyes:      Conjunctiva/sclera: Conjunctivae normal.      Pupils: Pupils are equal, round, and reactive to light.   Cardiovascular:      Rate and Rhythm: Normal rate and regular rhythm.      Pulses:           Radial pulses are 3+ on the right side and 3+ on the left side.        Dorsalis pedis pulses are 3+ on the right side and 3+ on the left side.      Heart sounds: Normal heart sounds. No murmur heard.     No friction rub. No gallop.   Pulmonary:      Effort: Pulmonary effort is normal. No respiratory distress.      Breath sounds: Normal breath sounds. No wheezing, rhonchi or rales.   Abdominal:      General: Abdomen is flat. Bowel sounds are normal.      Palpations: Abdomen is soft.      Tenderness: There is abdominal tenderness in the right lower quadrant. There is no right CVA tenderness, left CVA  tenderness, guarding or rebound. Negative signs include Handy's sign and McBurney's sign.   Musculoskeletal:         General: No swelling or deformity.      Cervical back: Full passive range of motion without pain.      Right lower leg: No edema.      Left lower leg: No edema.   Lymphadenopathy:      Cervical: No cervical adenopathy.   Skin:     Capillary Refill: Capillary refill takes less than 2 seconds.      Coloration: Skin is not jaundiced.      Findings: No rash.   Neurological:      General: No focal deficit present.      Mental Status: She is alert and oriented to person, place, and time. Mental status is at baseline.      Gait: Gait is intact.   Psychiatric:         Mood and Affect: Mood normal.         Behavior: Behavior is cooperative.         Labs Reviewed   CBC WITH AUTO DIFFERENTIAL - Abnormal       Result Value    WBC 14.0 (*)     nRBC 0.0      RBC 4.97      Hemoglobin 14.3      Hematocrit 44.0      MCV 89      MCH 28.8      MCHC 32.5      RDW 12.2      Platelets 278      Neutrophils % 89.8      Immature Granulocytes %, Automated 0.3      Lymphocytes % 6.9      Monocytes % 2.9      Eosinophils % 0.0      Basophils % 0.1      Neutrophils Absolute 12.55 (*)     Immature Granulocytes Absolute, Automated 0.04      Lymphocytes Absolute 0.96 (*)     Monocytes Absolute 0.41      Eosinophils Absolute 0.00      Basophils Absolute 0.02     COMPREHENSIVE METABOLIC PANEL - Abnormal    Glucose 128 (*)     Sodium 136      Potassium 3.9      Chloride 100      Bicarbonate 29      Anion Gap 11      Urea Nitrogen 15      Creatinine 0.66      eGFR >90      Calcium 9.5      Albumin 4.8      Alkaline Phosphatase 67      Total Protein 7.5      AST 21      Bilirubin, Total 1.0      ALT 25     URINALYSIS WITH REFLEX CULTURE AND MICROSCOPIC - Abnormal    Color, Urine Light-Yellow      Appearance, Urine Clear      Specific Gravity, Urine 1.041 (*)     pH, Urine 7.5      Protein, Urine NEGATIVE      Glucose, Urine Normal       Blood, Urine NEGATIVE      Ketones, Urine 20 (1+) (*)     Bilirubin, Urine NEGATIVE      Urobilinogen, Urine Normal      Nitrite, Urine NEGATIVE      Leukocyte Esterase, Urine NEGATIVE     LIPASE - Normal    Lipase 15      Narrative:     Venipuncture immediately after or during the administration of Metamizole may lead to falsely low results. Testing should be performed immediately prior to Metamizole dosing.   LACTATE - Normal    Lactate 1.3      Narrative:     Venipuncture immediately after or during the administration of Metamizole may lead to falsely low results. Testing should be performed immediately prior to Metamizole dosing.   SERIAL TROPONIN-INITIAL - Normal    Troponin I, High Sensitivity <3      Narrative:     Less than 99th percentile of normal range cutoff-  Female and children under 18 years old <14 ng/L; Male <21 ng/L: Negative  Repeat testing should be performed if clinically indicated.     Female and children under 18 years old 14-50 ng/L; Male 21-50 ng/L:  Consistent with possible cardiac damage and possible increased clinical   risk. Serial measurements may help to assess extent of myocardial damage.     >50 ng/L: Consistent with cardiac damage, increased clinical risk and  myocardial infarction. Serial measurements may help assess extent of   myocardial damage.      NOTE: Children less than 1 year old may have higher baseline troponin   levels and results should be interpreted in conjunction with the overall   clinical context.     NOTE: Troponin I testing is performed using a different   testing methodology at [facility] than at other   [facility]. Direct result comparisons should only   be made within the same method.   SERIAL TROPONIN, 1 HOUR - Normal    Troponin I, High Sensitivity <3      Narrative:     Less than 99th percentile of normal range cutoff-  Female and children under 18 years old <14 ng/L; Male <21 ng/L: Negative  Repeat testing should be performed if  clinically indicated.     Female and children under 18 years old 14-50 ng/L; Male 21-50 ng/L:  Consistent with possible cardiac damage and possible increased clinical   risk. Serial measurements may help to assess extent of myocardial damage.     >50 ng/L: Consistent with cardiac damage, increased clinical risk and  myocardial infarction. Serial measurements may help assess extent of   myocardial damage.      NOTE: Children less than 1 year old may have higher baseline troponin   levels and results should be interpreted in conjunction with the overall   clinical context.     NOTE: Troponin I testing is performed using a different   testing methodology at [facility] than at other   [facility]. Direct result comparisons should only   be made within the same method.   TROPONIN SERIES- (INITIAL, 1 HR)    Narrative:     The following orders were created for panel order Troponin I Series, High Sensitivity (0, 1 HR).  Procedure                               Abnormality         Status                     ---------                               -----------         ------                     Troponin I, High Sensiti...[[id number]]  Normal              Final result               Troponin, High Sensitivi...[[id number]]  Normal              Final result                 Please view results for these tests on the individual orders.   URINALYSIS WITH REFLEX CULTURE AND MICROSCOPIC    Narrative:     The following orders were created for panel order Urinalysis with Reflex Culture and Microscopic.  Procedure                               Abnormality         Status                     ---------                               -----------         ------                     Urinalysis with Reflex C...[[id number]]  Abnormal            Final result               Extra Urine Gray Tube[[id number]]                            In process                   Please view results for these tests on the individual orders.   EXTRA URINE  GRAY TUBE       CT abdomen pelvis w IV and oral contrast   Final Result   There is a dilated tubular structure in the right lower quadrant   measuring up to 1.5 cm as noted on prior CT. There is no significant   opacification with oral contrast. This is favored to relate to   dilated appendix with question subtle stranding in the right lower   quadrant. Very early acute appendicitis can not be excluded.   Correlate with physical and laboratory examination. Surgical   consultation is advised.        There is mild wall thickening extending from the mid transverse colon   to the rectosigmoid. This may relate to under distention although   early acute colitis not excluded in the appropriate clinical setting.   No pneumatosis or portal venous gas.        Additional findings as described above.        MACRO:   None        Signed by: Ann Stephens 6/24/2025 6:42 PM   Dictation workstation:   [id number]      CT abdomen pelvis w IV contrast   Final Result   Diffuse bowel wall thickening in the colon suggestive of colitis.        Mild distal esophageal thickening near the level of the GE junction,   correlate clinically for esophagitis.        Possibly dilated appendix. Recommend repeating examination with   positive oral contrast to exclude early appendicitis.        Signed by: Orlando Bird 6/24/2025 12:07 PM   Dictation workstation:   [id number]      US gallbladder   Final Result   Negative right upper quadrant ultrasound.        Signed by: Orlando Bird 6/24/2025 12:07 PM   Dictation workstation:   [id number]      XR chest 2 views   Final Result   Normal chest radiographs.        Signed by: Aguila High 6/24/2025 11:32 AM   Dictation workstation:   [id number]               ED Course & MDM   ED Course as of 06/24/25 2007 Tue Jun 24, 2025   1215 Reviewed the CT results and spoke with Ladarius, surgical resident, who agrees to attend in the ED. Patient aware of the findings and agrees with the above plan of  care.  [JZ]   1511 Following surgery evaluation, they requested that we obtain a CT of her abdomen and pelvis with oral contrast. Patient aware and agreeable with plan.  [JZ]      ED Course User Index  [JZ] Juma Camacho, APRN-CNP         Diagnoses as of 06/24/25 2007   Acute appendicitis, unspecified acute appendicitis type           Medical Decision Making  EKG interpreted by Dr. Michael. Indication: abdominal pain. Findings: NSR with a ventricular rate of 76, normal axis, normal intervals, and no acute ischemic or injury pattern. Impression: No acute pathology.       Patient was seen and evaluated by Dr. Michael. Placed on a cardiac monitor which showed normal sinus rhythm without ectopy throughout ED stay. Rhythm strip obtained showed normal sinus rhythm. Impression: No acute pathology. Continuous pulse oximetry monitoring showed no signs of hypoxia. Saline lock was established with labs drawn and results as above. Given 1 Liter of normal saline wide open over 1 hour. Given Zofran and Toradol as well. After receiving the medications and IV fluids, reported feeling improved. Noted to have an elevated WBC count of 14.0. Remainder of blood counts, electrolytes, kidney function, liver function, lipase, and lactate were unremarkable. Heart Score- 1 with normal EKG and troponin with delta troponin. At this time, we feel that the abdominal pain is atypical for acute coronary syndrome. We find no underlying evidence of an acute infectious process or pneumothorax on CXR. Clinically, we do not feel they are exhibiting signs of pulmonary embolism or thoracic aortic dissection (no connective tissue disorder, no tachycardia, tachypnea, hypoxia, and mediastinum normal in size on CXR). Urinalysis shows no signs of infection. RUQ US shows negative right upper quadrant ultrasound. CT scan of her abdomen and pelvis with contrast shows diffuse bowel wall thickening in the colon suggestive of colitis; mild distal esophageal  thickening near the level of the GE junction, correlate clinically for esophagitis; and possibly dilated appendix- recommend repeating examination with positive oral contrast to exclude early appendicitis. CT scan of her abdomen and pelvis with po contrast shows a dilated tubular structure in the right lower quadrant measuring up to 1.5 cm as noted on prior CT and there is no significant opacification with oral contrast- this is favored to relate to dilated appendix with question subtle stranding in the right lower quadrant and very early acute appendicitis can not be excluded. Dr. Mckeon was notified of the CT results and evaluated the patient in the ED. She plans to take the patient to the OR for an appendectomy. She was treated with Zosyn here. Placed on observation medicine immediately following or initial examination at 1004. Time was used to determine whether the patient was going to be admitted, discharged, or transferred to surgery. When is was determined that the patient was going to go to surgery at 1940, the patient was taken off of observation medicine.     Diagnostic Impression:    1. Acute appendicitis    2. IV meds and fluids in ED     3. Observation medicine               Your medication list      You have not been prescribed any medications.           Procedure  Procedures         [1] History reviewed. No pertinent past medical history.  [2] History reviewed. No pertinent surgical history.  [3]   Family History  Problem Relation Name Age of Onset    Hypertension Mother      Hyperlipidemia Mother      Uterine cancer Mother      Hypertension Father      Hyperlipidemia Father      Lung cancer Father      Liver cancer Father      Bone cancer Father      Coronary artery disease Mother's Brother      Aneurysm Mother's Brother  37    Diabetes type II Father's Brother      Coronary artery disease Father's Brother     [4]   Social History  Tobacco Use    Smoking status: Never    Smokeless tobacco: Never    Substance Use Topics    Alcohol use: Not on file    Drug use: Not on file        Juma Camacho, TYRONE-EMMA  06/24/25 2008

## 2025-06-24 NOTE — H&P
"History Of Present Illness  Priya Hoover is a 59 y.o. female presenting with abd pain.  With ct showing 1.4 cm tubular lesion in RLQ.      Past Medical History  Medical History[1]    Surgical History  Surgical History[2]     Social History  She reports that she has never smoked. She has never used smokeless tobacco. No history on file for alcohol use and drug use.    Family History  Family History[3]     Allergies  Sulfamethoxazole-trimethoprim    Review of Systems     Physical Exam  RRR non labored breathing, tender RLQ     Last Recorded Vitals  Blood pressure 109/78, pulse 99, temperature 36.7 °C (98.1 °F), resp. rate 18, height 1.499 m (4' 11\"), weight 52.6 kg (116 lb), SpO2 98%.    Relevant Results             Assessment & Plan  Acute appendicitis with localized peritonitis, without perforation, abscess, or gangrene      Counseled for surgery.  Pt agreeable to procedure.     I spent 30 minutes in the professional and overall care of this patient.      Trinidad Mckeon MD         [1] History reviewed. No pertinent past medical history.  [2] History reviewed. No pertinent surgical history.  [3]   Family History  Problem Relation Name Age of Onset    Hypertension Mother      Hyperlipidemia Mother      Uterine cancer Mother      Hypertension Father      Hyperlipidemia Father      Lung cancer Father      Liver cancer Father      Bone cancer Father      Coronary artery disease Mother's Brother      Aneurysm Mother's Brother  37    Diabetes type II Father's Brother      Coronary artery disease Father's Brother       "

## 2025-06-25 LAB
ANION GAP SERPL CALC-SCNC: 11 MMOL/L (ref 10–20)
BUN SERPL-MCNC: 10 MG/DL (ref 6–23)
CALCIUM SERPL-MCNC: 8.4 MG/DL (ref 8.6–10.3)
CHLORIDE SERPL-SCNC: 107 MMOL/L (ref 98–107)
CO2 SERPL-SCNC: 23 MMOL/L (ref 21–32)
CREAT SERPL-MCNC: 0.71 MG/DL (ref 0.5–1.05)
EGFRCR SERPLBLD CKD-EPI 2021: >90 ML/MIN/1.73M*2
ERYTHROCYTE [DISTWIDTH] IN BLOOD BY AUTOMATED COUNT: 12.7 % (ref 11.5–14.5)
GLUCOSE SERPL-MCNC: 145 MG/DL (ref 74–99)
HCT VFR BLD AUTO: 34 % (ref 36–46)
HGB BLD-MCNC: 10.9 G/DL (ref 12–16)
HOLD SPECIMEN: NORMAL
MAGNESIUM SERPL-MCNC: 1.95 MG/DL (ref 1.6–2.4)
MCH RBC QN AUTO: 29 PG (ref 26–34)
MCHC RBC AUTO-ENTMCNC: 32.1 G/DL (ref 32–36)
MCV RBC AUTO: 90 FL (ref 80–100)
NRBC BLD-RTO: 0 /100 WBCS (ref 0–0)
PLATELET # BLD AUTO: 217 X10*3/UL (ref 150–450)
POTASSIUM SERPL-SCNC: 4.1 MMOL/L (ref 3.5–5.3)
RBC # BLD AUTO: 3.76 X10*6/UL (ref 4–5.2)
SODIUM SERPL-SCNC: 137 MMOL/L (ref 136–145)
WBC # BLD AUTO: 16.5 X10*3/UL (ref 4.4–11.3)

## 2025-06-25 PROCEDURE — 83735 ASSAY OF MAGNESIUM: CPT | Performed by: SURGERY

## 2025-06-25 PROCEDURE — 2500000004 HC RX 250 GENERAL PHARMACY W/ HCPCS (ALT 636 FOR OP/ED): Performed by: SURGERY

## 2025-06-25 PROCEDURE — 80048 BASIC METABOLIC PNL TOTAL CA: CPT | Performed by: SURGERY

## 2025-06-25 PROCEDURE — 85027 COMPLETE CBC AUTOMATED: CPT | Performed by: SURGERY

## 2025-06-25 PROCEDURE — 96366 THER/PROPH/DIAG IV INF ADDON: CPT | Mod: 59

## 2025-06-25 PROCEDURE — 2500000004 HC RX 250 GENERAL PHARMACY W/ HCPCS (ALT 636 FOR OP/ED)

## 2025-06-25 PROCEDURE — 96376 TX/PRO/DX INJ SAME DRUG ADON: CPT | Mod: 59

## 2025-06-25 PROCEDURE — 2500000004 HC RX 250 GENERAL PHARMACY W/ HCPCS (ALT 636 FOR OP/ED): Mod: JZ

## 2025-06-25 PROCEDURE — 2500000001 HC RX 250 WO HCPCS SELF ADMINISTERED DRUGS (ALT 637 FOR MEDICARE OP): Performed by: SURGERY

## 2025-06-25 PROCEDURE — G0378 HOSPITAL OBSERVATION PER HR: HCPCS

## 2025-06-25 PROCEDURE — 96372 THER/PROPH/DIAG INJ SC/IM: CPT | Performed by: SURGERY

## 2025-06-25 PROCEDURE — 36415 COLL VENOUS BLD VENIPUNCTURE: CPT | Performed by: SURGERY

## 2025-06-25 PROCEDURE — 96361 HYDRATE IV INFUSION ADD-ON: CPT | Mod: 59

## 2025-06-25 RX ORDER — KETOROLAC TROMETHAMINE 30 MG/ML
30 INJECTION, SOLUTION INTRAMUSCULAR; INTRAVENOUS ONCE
Status: COMPLETED | OUTPATIENT
Start: 2025-06-25 | End: 2025-06-25

## 2025-06-25 RX ADMIN — POLYETHYLENE GLYCOL 3350 17 G: 17 POWDER, FOR SOLUTION ORAL at 07:51

## 2025-06-25 RX ADMIN — PIPERACILLIN SODIUM AND TAZOBACTAM SODIUM 3.38 G: 3; .375 INJECTION, SOLUTION INTRAVENOUS at 02:02

## 2025-06-25 RX ADMIN — PANTOPRAZOLE SODIUM 40 MG: 40 TABLET, DELAYED RELEASE ORAL at 06:47

## 2025-06-25 RX ADMIN — KETOROLAC TROMETHAMINE 30 MG: 30 INJECTION, SOLUTION INTRAMUSCULAR at 17:19

## 2025-06-25 RX ADMIN — ENOXAPARIN SODIUM 40 MG: 100 INJECTION SUBCUTANEOUS at 20:23

## 2025-06-25 RX ADMIN — PIPERACILLIN SODIUM AND TAZOBACTAM SODIUM 3.38 G: 3; .375 INJECTION, SOLUTION INTRAVENOUS at 20:23

## 2025-06-25 RX ADMIN — OXYCODONE HYDROCHLORIDE AND ACETAMINOPHEN 1 TABLET: 5; 325 TABLET ORAL at 20:31

## 2025-06-25 RX ADMIN — PIPERACILLIN SODIUM AND TAZOBACTAM SODIUM 3.38 G: 3; .375 INJECTION, SOLUTION INTRAVENOUS at 13:26

## 2025-06-25 RX ADMIN — OXYCODONE HYDROCHLORIDE AND ACETAMINOPHEN 1 TABLET: 5; 325 TABLET ORAL at 11:53

## 2025-06-25 RX ADMIN — PIPERACILLIN SODIUM AND TAZOBACTAM SODIUM 3.38 G: 3; .375 INJECTION, SOLUTION INTRAVENOUS at 07:51

## 2025-06-25 ASSESSMENT — COGNITIVE AND FUNCTIONAL STATUS - GENERAL
DAILY ACTIVITIY SCORE: 24
WALKING IN HOSPITAL ROOM: A LITTLE
WALKING IN HOSPITAL ROOM: A LITTLE
MOBILITY SCORE: 22
CLIMB 3 TO 5 STEPS WITH RAILING: A LITTLE
DAILY ACTIVITIY SCORE: 24
CLIMB 3 TO 5 STEPS WITH RAILING: A LITTLE
MOBILITY SCORE: 22

## 2025-06-25 ASSESSMENT — PAIN SCALES - GENERAL
PAINLEVEL_OUTOF10: 4
PAINLEVEL_OUTOF10: 0 - NO PAIN
PAINLEVEL_OUTOF10: 6
PAINLEVEL_OUTOF10: 0 - NO PAIN
PAINLEVEL_OUTOF10: 0 - NO PAIN
PAINLEVEL_OUTOF10: 7
PAINLEVEL_OUTOF10: 2
PAINLEVEL_OUTOF10: 0 - NO PAIN

## 2025-06-25 ASSESSMENT — PAIN DESCRIPTION - LOCATION
LOCATION: ABDOMEN

## 2025-06-25 ASSESSMENT — PAIN - FUNCTIONAL ASSESSMENT
PAIN_FUNCTIONAL_ASSESSMENT: 0-10

## 2025-06-25 ASSESSMENT — PAIN DESCRIPTION - DESCRIPTORS
DESCRIPTORS: STABBING
DESCRIPTORS: ACHING;DISCOMFORT
DESCRIPTORS: ACHING;DISCOMFORT

## 2025-06-25 ASSESSMENT — PAIN DESCRIPTION - ORIENTATION
ORIENTATION: LOWER
ORIENTATION: LOWER

## 2025-06-25 NOTE — ANESTHESIA PROCEDURE NOTES
Airway  Date/Time: 6/24/2025 8:29 PM  Reason: elective    Airway not difficult    Staffing  Performed: CRNA   Authorized by: RICO Nielsen    Performed by: RICO Nielsen  Patient location during procedure: OR    Patient Condition  Indications for airway management: anesthesia  Patient position: sniffing  Planned trial extubation  Sedation level: deep     Final Airway Details   Preoxygenated: yes  Final airway type: endotracheal airway  Successful airway: ETT  Cuffed: yes   Successful intubation technique: video laryngoscopy  Adjuncts used in placement: intubating stylet  Endotracheal tube insertion site: oral  Blade: Reshma  Blade size: #3  ETT size (mm): 7.0  Cormack-Lehane Classification: grade I - full view of glottis  Placement verified by: chest auscultation and capnometry   Measured from: lips  ETT to lips (cm): 22  Number of attempts at approach: 1  Number of other approaches attempted: 0

## 2025-06-25 NOTE — CARE PLAN
Problem: Pain - Adult  Goal: Verbalizes/displays adequate comfort level or baseline comfort level  Outcome: Progressing  Flowsheets (Taken 6/25/2025 0146)  Verbalizes/displays adequate comfort level or baseline comfort level:   Assess pain using appropriate pain scale   Administer analgesics based on type and severity of pain and evaluate response   Encourage patient to monitor pain and request assistance   Implement non-pharmacological measures as appropriate and evaluate response   Consider cultural and social influences on pain and pain management   Notify Licensed Independent Practitioner if interventions unsuccessful or patient reports new pain     Problem: Chronic Conditions and Co-morbidities  Goal: Patient's chronic conditions and co-morbidity symptoms are monitored and maintained or improved  Outcome: Progressing  Flowsheets (Taken 6/25/2025 0146)  Care Plan - Patient's Chronic Conditions and Co-Morbidity Symptoms are Monitored and Maintained or Improved:   Monitor and assess patient's chronic conditions and comorbid symptoms for stability, deterioration, or improvement   Collaborate with multidisciplinary team to address chronic and comorbid conditions and prevent exacerbation or deterioration   Update acute care plan with appropriate goals if chronic or comorbid symptoms are exacerbated and prevent overall improvement and discharge

## 2025-06-25 NOTE — OP NOTE
APPENDECTOMY, LAPAROSCOPIC Operative Note     Date: 2025  OR Location: [facility]    Name: Priya Hoover, : 1965, Age: 59 y.o., MRN: [id number], Sex: female    Diagnosis  Pre-op Diagnosis      * Acute appendicitis with localized peritonitis, without perforation, abscess, or gangrene [K35.30] Post-op Diagnosis     * Acute appendicitis with localized peritonitis, without perforation, abscess, or gangrene [K35.30]     Procedures  APPENDECTOMY, LAPAROSCOPIC  17824 - [place] LAPAROSCOPIC APPENDECTOMY      Surgeons      * Trinidad Mckeon - Primary    Resident/Fellow/Other Assistant:  Surgeons and Role:  * No surgeons found with a matching role *    Staff:   Circulator: Jordy  Surgical Assistant: Bailey  Circulator: Phoebe Garza Person: Stefanie    Anesthesia Staff: CRNA: RICO Nielsen    Procedure Summary  Anesthesia: Anesthesia type not filed in the log.  ASA: II  Estimated Blood Loss: 2 mL  Intra-op Medications:   Administrations occurring from  to  on 25:   Medication Name Total Dose   BUPivacaine HCl (Marcaine) 0.25 % (2.5 mg/mL) injection 6 mL   ketorolac (Toradol) 30 mg 30 mg   ondansetron 2 mg/mL 8 mg   rocuronium (ZeMuron) 50 mg/5 mL injection 50 mg              Anesthesia Record               Intraprocedure I/O Totals       None           Specimen:   ID Type Source Tests Collected by Time   1 : APPENDIX Tissue APPENDIX SURGICAL PATHOLOGY EXAM Trinidad Mckeon MD 2025                 Drains and/or Catheters:   Urethral Catheter Non-latex 16 Fr. (Active)       Tourniquet Times:         Implants:     Findings: Acutely inflamed appendix     Indications: Priya Hoover is an 59 y.o. female who is having surgery for Acute appendicitis with localized peritonitis, without perforation, abscess, or gangrene [K35.30].     The patient was seen in the preoperative area. The risks, benefits, complications, treatment options, non-operative alternatives, expected recovery and outcomes were  discussed with the patient. The possibilities of reaction to medication, pulmonary aspiration, injury to surrounding structures, bleeding, recurrent infection, the need for additional procedures, failure to diagnose a condition, and creating a complication requiring transfusion or operation were discussed with the patient. The patient concurred with the proposed plan, giving informed consent.  The site of surgery was properly noted/marked if necessary per policy. The patient has been actively warmed in preoperative area. Preoperative antibiotics have been ordered and given within 1 hours of incision. Venous thrombosis prophylaxis have been ordered including bilateral sequential compression devices    Procedure Details: ndication for surgery:  Pt with right lower quadrant pain X 1 days.  Counseled for Lap appy possible open agreeable to proceed.     Patient was taken operating room placed in supine position general anesthesia was administered with endotracheal intubated abdomen was prepped and draped in sterile fashion.  The area was localized below the umbilicus.  The incision was made carried down sharply skin and subcutaneous tissue to the fascia the fascia was opened the abdomen was entered.  O Vicryl stay sutures were placed on either side of the fascia.  The Hagan trocar was inserted abdomen was insufflated to 15 cm CO2 patient was placed in Trendelenburg and airplane to the left.  1 left lower quadrant port was placed under direct vision and one suprapubic port was also placed under direct vision the appendix was visualized the base of the appendix was isolated.  Transected with the Endo NARENDRA stapler.  The mesoappendix was similarly isolated.  Transected with the vascular Endo NARENDRA stapler.  An small area still attached the appendix this was transected after being clipped on both sides. The  abdomen was irrigated.  The appendix is placed in an Endo Catch bag and brought out through the umbilical port site.  The  ports were removed under direct vision there was bleeding noted from the sites this was controlled with electrocautery and then over sewn with 0-0 Vycril in figure of eight fashion from both port sites.  The 0 Vicryl stay sutures had been placed and the fascia were tied in a pursestring fashion after the abdomen was desufflated skin was closed with 4-0 Vicryl in a subcuticular fashion.  Steri-Strips dressings were applied patient was awakened and extubated taken to PACU in stable condition.  Evidence of Infection: Yes; Purulent fluid Below the level of the fascia (organ/space)  Complications:  None; patient tolerated the procedure well.    Disposition: PACU - hemodynamically stable.  Condition: stable         Task Performed by RNFA or Surgical Assistant:  Assisted with surgery and patient preparation.           Additional Details:     Attending Attestation: A qualified resident physician was not available.    Trinidad Mckeon  Phone Number: [phone]

## 2025-06-25 NOTE — CARE PLAN
The patient's goals for the shift include        Problem: Pain - Adult  Goal: Verbalizes/displays adequate comfort level or baseline comfort level  Outcome: Progressing     Problem: Safety - Adult  Goal: Free from fall injury  Outcome: Progressing     Problem: Discharge Planning  Goal: Discharge to home or other facility with appropriate resources  Outcome: Progressing     Problem: Chronic Conditions and Co-morbidities  Goal: Patient's chronic conditions and co-morbidity symptoms are monitored and maintained or improved  Outcome: Progressing     Problem: Nutrition  Goal: Nutrient intake appropriate for maintaining nutritional needs  Outcome: Progressing     Problem: Fall/Injury  Goal: Not fall by end of shift  Outcome: Progressing  Goal: Be free from injury by end of the shift  Outcome: Progressing  Goal: Verbalize understanding of personal risk factors for fall in the hospital  Outcome: Progressing  Goal: Verbalize understanding of risk factor reduction measures to prevent injury from fall in the home  Outcome: Progressing  Goal: Use assistive devices by end of the shift  Outcome: Progressing  Goal: Pace activities to prevent fatigue by end of the shift  Outcome: Progressing     Problem: Skin  Goal: Promote/optimize nutrition  Outcome: Progressing  Flowsheets (Taken 6/25/2025 0719)  Promote/optimize nutrition: Monitor/record intake including meals  Goal: Promote skin healing  Outcome: Progressing  Flowsheets (Taken 6/25/2025 0719)  Promote skin healing:   Ensure correct size (line/device) and apply per  instructions   Rotate device position/do not position patient on device

## 2025-06-25 NOTE — ANESTHESIA PREPROCEDURE EVALUATION
Patient: Priya Hoover    Procedure Information       Date/Time: 06/24/25 2030    Procedure: APPENDECTOMY, LAPAROSCOPIC    Location: [facility] / [facility]    Surgeons: Trinidad Mckeon MD            Relevant Problems   Anesthesia (within normal limits)       Clinical information reviewed:   Tobacco  Allergies  Meds   Med Hx  Surg Hx   Fam Hx  Soc Hx        NPO Detail:  No data recorded     Physical Exam    Airway  Mallampati: II  TM distance: >3 FB  Neck ROM: full  Mouth opening: 3 or more finger widths     Cardiovascular - normal exam   Dental - normal exam     Pulmonary - normal exam   Abdominal - normal exam           Anesthesia Plan    History of general anesthesia?: yes  History of complications of general anesthesia?: no    ASA 2     general     The patient is not a current smoker.    intravenous induction   Postoperative pain plan includes opioids.  Anesthetic plan and risks discussed with patient.    Plan discussed with CRNA.

## 2025-06-25 NOTE — PROGRESS NOTES
"Priya Hoover is a 59 y.o. female on day 0 of admission presenting with Acute appendicitis with localized peritonitis, without perforation, abscess, or gangrene.    Subjective   Patient to OR overnight for laparoscopic appendectomy. Vitals stable and pain well controlled. Patient reports some intermittent spasms in lower abdomen that last for 15 seconds. Denies fever, chills, chest pain, SOB, nausea and dysuria.        Objective     Physical Exam  Constitutional:       Appearance: Normal appearance.   HENT:      Head: Normocephalic and atraumatic.      Mouth/Throat:      Mouth: Mucous membranes are moist.   Eyes:      General: No scleral icterus.     Extraocular Movements: Extraocular movements intact.   Cardiovascular:      Rate and Rhythm: Normal rate.   Pulmonary:      Effort: Pulmonary effort is normal. No respiratory distress.      Comments: Demonstrates 1000 mL on IS  Abdominal:      General: There is no distension.      Palpations: Abdomen is soft.      Tenderness: There is abdominal tenderness. There is no guarding or rebound.      Comments: Appropriate tenderness to palpation without rebound or guarding. 3 incisions covered with gauze and tegederm with no notable drainage.    Musculoskeletal:         General: No swelling. Normal range of motion.      Cervical back: Normal range of motion.   Skin:     General: Skin is warm and dry.      Capillary Refill: Capillary refill takes 2 to 3 seconds.   Neurological:      General: No focal deficit present.      Mental Status: She is alert and oriented to person, place, and time.   Psychiatric:         Mood and Affect: Mood normal.         Behavior: Behavior normal.         Last Recorded Vitals  Blood pressure 109/68, pulse 57, temperature 36.9 °C (98.4 °F), temperature source Temporal, resp. rate 16, height 1.499 m (4' 11\"), weight 52.6 kg (116 lb), SpO2 98%.  Intake/Output last 3 Shifts:  I/O last 3 completed shifts:  In: 2150 (40.9 mL/kg) [P.O.:250; I.V.:900 (17.1 " mL/kg); IV Piggyback:1000]  Out: 151 (2.9 mL/kg) [Urine:151 (0.1 mL/kg/hr)]  Weight: 52.6 kg     Relevant Results  CT abdomen pelvis w IV and oral contrast  Result Date: 6/24/2025  Interpreted By:  Ann Stephens, STUDY: CT ABDOMEN PELVIS W IV AND ORAL CONTRAST;  6/24/2025 6:27 pm   INDICATION: Signs/Symptoms:Possible dilation of the appendix on prior CT- recommended po contrast study per radiologist.   COMPARISON: CT scan of the abdomen pelvis 06/24/2025   ACCESSION NUMBER(S): [id number]   ORDERING CLINICIAN: DANTE HIRSCH   TECHNIQUE: Axial CT images of the abdomen and pelvis with coronal and sagittal reconstructed images obtained after intravenous administration of 75 mL of Omnipaque 350.   FINDINGS: LOWER CHEST: No acute abnormality of the lung bases.   ABDOMEN:   LIVER: Within normal limits. BILE DUCTS: Normal caliber. GALLBLADDER: No calcified gallstones. No wall thickening. Layering hyperdense material in the gallbladder likely relates to vicarious excretion of contrast. PANCREAS: Within normal limits. SPLEEN: Within normal limits. ADRENALS: Within normal limits. KIDNEYS and URETERS: Symmetric renal enhancement. No hydronephrosis or perinephric fluid collection. Contrast within the collecting system likely relates to prior CT.   VESSELS:   No aortic aneurysm. RETROPERITONEUM: No pathologically enlarged retroperitoneal lymph nodes.   PELVIS:   REPRODUCTIVE ORGANS: Uterus is present. No adnexal mass. Prominent parametrial vessels noted. BLADDER: Bladder is partially distended. Contrast within the bladder causes beam hardening artifact slightly limiting evaluation.   BOWEL: Stomach is underdistended. Visualized loops of bowel are without evidence for obstruction. There is a dilated tubular structure in the right lower quadrant measuring up to 1.5 cm as noted on prior CT. There is no significant opacification with oral contrast. This is favored to relate to dilated appendix with question subtle stranding in  the right lower quadrant early acute appendicitis can not be excluded. There is mild wall thickening extending from the mid transverse colon to the rectosigmoid. This may relate to under distention although early acute colitis not excluded. No pneumatosis or portal venous gas. PERITONEUM: No ascites or free air, no fluid collection.   ABDOMINAL WALL: Within normal limits. BONES: Degenerative changes of the spine.       There is a dilated tubular structure in the right lower quadrant measuring up to 1.5 cm as noted on prior CT. There is no significant opacification with oral contrast. This is favored to relate to dilated appendix with question subtle stranding in the right lower quadrant. Very early acute appendicitis can not be excluded. Correlate with physical and laboratory examination. Surgical consultation is advised.   There is mild wall thickening extending from the mid transverse colon to the rectosigmoid. This may relate to under distention although early acute colitis not excluded in the appropriate clinical setting. No pneumatosis or portal venous gas.   Additional findings as described above.   MACRO: None   Signed by: Ann Stephens 6/24/2025 6:42 PM Dictation workstation:   [id number]    US gallbladder  Result Date: 6/24/2025  Interpreted By:  Orladno Bird, STUDY: US GALLBLADDER;  6/24/2025 11:47 am   INDICATION: Signs/Symptoms:RUQ pain.   COMPARISON: None.   ACCESSION NUMBER(S): [id number]   ORDERING CLINICIAN: DANTE HIRSCH   TECHNIQUE: Multiple images of the right upper quadrant were obtained.  Gray scale, color Doppler and spectral Doppler waveform analysis was performed.   FINDINGS: LIVER: 11.7 cm in length. No focal abnormality. Normal hepatic echogenicity.   GALLBLADDER: No gallstones. No gallbladder wall thickening. Negative sonographic Handy's sign.   BILIARY SYSTEM: Nondilated.   PANCREAS: Visualized portion of the body unremarkable. Remainder obscured by overlying bowel gas.    RIGHT KIDNEY: 10.2 cm in length. No hydronephrosis. No focal renal abnormality.       Negative right upper quadrant ultrasound.   Signed by: Orlando Bird 6/24/2025 12:07 PM Dictation workstation:   [id number]    CT abdomen pelvis w IV contrast  Result Date: 6/24/2025  Interpreted By:  Orlando Bird, STUDY: CT ABDOMEN PELVIS W IV CONTRAST;  6/24/2025 11:51 am   INDICATION: 58 y/o   F with  Signs/Symptoms:abdominal pain.   LIMITATIONS: None.   ACCESSION NUMBER(S): [id number]   ORDERING CLINICIAN: DANTE HIRSCH   TECHNIQUE: After the administration of IV iodonated contrast, spiral axial images were obtained from the xiphoid down through the symphysis pubis. Sagittal and coronal reconstruction images were generated. 75 mL of Omnipaque 350.   COMPARISON: None.   FINDINGS: Lower Chest: Streaky left basilar atelectasis. Mild distal esophageal thickening near the level of the GE junction, correlate clinically for esophagitis.   Liver: The liver is unremarkable without focal lesion.   Gallbladder and Biliary: Unremarkable.   Pancreas: No abnormality identified in the pancreas.   Spleen: No abnormality identified in the spleen.   Adrenals: No abnormality identified in either adrenal gland.   Urinary: No parenchymal abnormality identified in either kidney. No hydronephrosis.   Gastrointestinal/Peritoneum: No small or large bowel obstruction in the visualized abdomen. In the abdomen, there is no extraluminal air. No significant free fluid. Mild diffuse bowel wall thickening in the colon. There is a structure along the posterior aspect of the cecum distinct from the terminal ileum, which may represent an enlarged appendix, measuring 14 mm in diameter. Gas and stool are seen throughout the structure and there is no wall thickening or adjacent stranding, however this would be considered abnormally enlarged for an appendix.   Vascular: Abdominal aorta is normal in caliber.   Lymphatics: No enlarged lymph nodes by size  criteria.   MSK/Body Wall: No aggressive bony lesion identified.       Diffuse bowel wall thickening in the colon suggestive of colitis.   Mild distal esophageal thickening near the level of the GE junction, correlate clinically for esophagitis.   Possibly dilated appendix. Recommend repeating examination with positive oral contrast to exclude early appendicitis.   Signed by: Orlando Bird 6/24/2025 12:07 PM Dictation workstation:   [id number]    XR chest 2 views  Result Date: 6/24/2025  Interpreted By:  Aguila High, STUDY: XR CHEST 2 VIEWS; 6/24/2025 11:11 am   INDICATION: Signs/Symptoms:RUQ pain   COMPARISON: None.   ACCESSION NUMBER(S): [id number]   ORDERING CLINICIAN: DANTE HIRSCH   TECHNIQUE: Number of films: Two-view radiographs of the chest were obtained.   FINDINGS: The heart and mediastinum are normal. The lungs are clear. No pleural effusions are seen. The osseous structures are unremarkable.       Normal chest radiographs.   Signed by: Aguila High 6/24/2025 11:32 AM Dictation workstation:   [id number]    ECG 12 lead  Result Date: 6/24/2025  Sinus rhythm RSR' in V1 or V2, right VCD or RVH      Assessment & Plan  Acute appendicitis with localized peritonitis, without perforation, abscess, or gangrene    Acute appendicitis with appendiceal abscess    Ms. Priya Hoover is a 60 y/o female s/p laparoscopic appendectomy on 6/24/25    Plan:  - Advance to regular diet  - PRN pain control  - PRN Zofran  - Discontinue IV fluids  - Leukocytosis to 16.5 - likely reactive s/p surgery. Will continue antibiotics for 24 hours postoperatively  - Encourage ambulation  - Likely discharge 6/26/25    Patient seen and discussed with attending, Dr. Linda Nuñez MD  PGY 2 General Surgery

## 2025-06-25 NOTE — CARE PLAN
Problem: Pain - Adult  Goal: Verbalizes/displays adequate comfort level or baseline comfort level  Outcome: Progressing  Flowsheets (Taken 6/25/2025 1950)  Verbalizes/displays adequate comfort level or baseline comfort level:   Encourage patient to monitor pain and request assistance   Assess pain using appropriate pain scale   Implement non-pharmacological measures as appropriate and evaluate response   Consider cultural and social influences on pain and pain management   Notify Licensed Independent Practitioner if interventions unsuccessful or patient reports new pain   Administer analgesics based on type and severity of pain and evaluate response     Problem: Chronic Conditions and Co-morbidities  Goal: Patient's chronic conditions and co-morbidity symptoms are monitored and maintained or improved  Outcome: Progressing  Flowsheets (Taken 6/25/2025 1950)  Care Plan - Patient's Chronic Conditions and Co-Morbidity Symptoms are Monitored and Maintained or Improved:   Monitor and assess patient's chronic conditions and comorbid symptoms for stability, deterioration, or improvement   Collaborate with multidisciplinary team to address chronic and comorbid conditions and prevent exacerbation or deterioration   Update acute care plan with appropriate goals if chronic or comorbid symptoms are exacerbated and prevent overall improvement and discharge

## 2025-06-25 NOTE — PROGRESS NOTES
06/25/25 1619   Discharge Planning   Living Arrangements Spouse/significant other   Support Systems Spouse/significant other   Assistance Needed N/A   Type of Residence Private residence   Home or Post Acute Services None   Expected Discharge Disposition Home   Does the patient need discharge transport arranged? No   Intensity of Service   Intensity of Service 0-30 min     Patient admitted from home with spouse. She is able to manage her own care at baseline, performs ADLS/IADLS by self at baseline. PCP is Tod Topete. She was admitted for acute appendicitis, she did undergo lap appe with Dr Mckeon. She is under observation status. Patient to return home when she is medically ready. TCC to follow if needs should arise

## 2025-06-26 ENCOUNTER — PHARMACY VISIT (OUTPATIENT)
Dept: PHARMACY | Facility: CLINIC | Age: 60
End: 2025-06-26
Payer: COMMERCIAL

## 2025-06-26 VITALS
DIASTOLIC BLOOD PRESSURE: 57 MMHG | BODY MASS INDEX: 23.39 KG/M2 | OXYGEN SATURATION: 97 % | RESPIRATION RATE: 18 BRPM | SYSTOLIC BLOOD PRESSURE: 95 MMHG | WEIGHT: 116 LBS | HEIGHT: 59 IN | TEMPERATURE: 97.8 F | HEART RATE: 74 BPM

## 2025-06-26 LAB
ANION GAP SERPL CALC-SCNC: 9 MMOL/L (ref 10–20)
BASOPHILS # BLD AUTO: 0.02 X10*3/UL (ref 0–0.1)
BASOPHILS NFR BLD AUTO: 0.2 %
BUN SERPL-MCNC: 13 MG/DL (ref 6–23)
CALCIUM SERPL-MCNC: 8.6 MG/DL (ref 8.6–10.3)
CHLORIDE SERPL-SCNC: 107 MMOL/L (ref 98–107)
CO2 SERPL-SCNC: 26 MMOL/L (ref 21–32)
CREAT SERPL-MCNC: 0.93 MG/DL (ref 0.5–1.05)
EGFRCR SERPLBLD CKD-EPI 2021: 71 ML/MIN/1.73M*2
EOSINOPHIL # BLD AUTO: 0.07 X10*3/UL (ref 0–0.7)
EOSINOPHIL NFR BLD AUTO: 0.8 %
ERYTHROCYTE [DISTWIDTH] IN BLOOD BY AUTOMATED COUNT: 12.6 % (ref 11.5–14.5)
GLUCOSE SERPL-MCNC: 89 MG/DL (ref 74–99)
HCT VFR BLD AUTO: 33.1 % (ref 36–46)
HGB BLD-MCNC: 11 G/DL (ref 12–16)
IMM GRANULOCYTES # BLD AUTO: 0.04 X10*3/UL (ref 0–0.7)
IMM GRANULOCYTES NFR BLD AUTO: 0.4 % (ref 0–0.9)
LYMPHOCYTES # BLD AUTO: 3.07 X10*3/UL (ref 1.2–4.8)
LYMPHOCYTES NFR BLD AUTO: 33.2 %
MCH RBC QN AUTO: 29.6 PG (ref 26–34)
MCHC RBC AUTO-ENTMCNC: 33.2 G/DL (ref 32–36)
MCV RBC AUTO: 89 FL (ref 80–100)
MONOCYTES # BLD AUTO: 0.8 X10*3/UL (ref 0.1–1)
MONOCYTES NFR BLD AUTO: 8.6 %
NEUTROPHILS # BLD AUTO: 5.26 X10*3/UL (ref 1.2–7.7)
NEUTROPHILS NFR BLD AUTO: 56.8 %
NRBC BLD-RTO: 0 /100 WBCS (ref 0–0)
PLATELET # BLD AUTO: 201 X10*3/UL (ref 150–450)
POTASSIUM SERPL-SCNC: 3.5 MMOL/L (ref 3.5–5.3)
RBC # BLD AUTO: 3.71 X10*6/UL (ref 4–5.2)
SODIUM SERPL-SCNC: 138 MMOL/L (ref 136–145)
WBC # BLD AUTO: 9.3 X10*3/UL (ref 4.4–11.3)

## 2025-06-26 PROCEDURE — G0378 HOSPITAL OBSERVATION PER HR: HCPCS

## 2025-06-26 PROCEDURE — 80048 BASIC METABOLIC PNL TOTAL CA: CPT

## 2025-06-26 PROCEDURE — 2500000001 HC RX 250 WO HCPCS SELF ADMINISTERED DRUGS (ALT 637 FOR MEDICARE OP): Performed by: SURGERY

## 2025-06-26 PROCEDURE — 2500000004 HC RX 250 GENERAL PHARMACY W/ HCPCS (ALT 636 FOR OP/ED): Performed by: SURGERY

## 2025-06-26 PROCEDURE — RXMED WILLOW AMBULATORY MEDICATION CHARGE

## 2025-06-26 PROCEDURE — 36415 COLL VENOUS BLD VENIPUNCTURE: CPT

## 2025-06-26 PROCEDURE — 85025 COMPLETE CBC W/AUTO DIFF WBC: CPT

## 2025-06-26 RX ORDER — OXYCODONE AND ACETAMINOPHEN 5; 325 MG/1; MG/1
1 TABLET ORAL EVERY 6 HOURS PRN
Qty: 12 TABLET | Refills: 0 | Status: SHIPPED | OUTPATIENT
Start: 2025-06-26 | End: 2025-06-29

## 2025-06-26 RX ORDER — POLYETHYLENE GLYCOL 3350 17 G/17G
17 POWDER, FOR SOLUTION ORAL DAILY
Qty: 119 G | Refills: 0 | Status: SHIPPED | OUTPATIENT
Start: 2025-06-26

## 2025-06-26 RX ADMIN — ACETAMINOPHEN 650 MG: 325 TABLET ORAL at 00:52

## 2025-06-26 RX ADMIN — PANTOPRAZOLE SODIUM 40 MG: 40 TABLET, DELAYED RELEASE ORAL at 06:44

## 2025-06-26 RX ADMIN — OXYCODONE HYDROCHLORIDE AND ACETAMINOPHEN 1 TABLET: 5; 325 TABLET ORAL at 06:44

## 2025-06-26 RX ADMIN — POLYETHYLENE GLYCOL 3350 17 G: 17 POWDER, FOR SOLUTION ORAL at 09:02

## 2025-06-26 ASSESSMENT — COGNITIVE AND FUNCTIONAL STATUS - GENERAL
DRESSING REGULAR LOWER BODY CLOTHING: A LITTLE
MOBILITY SCORE: 22
DAILY ACTIVITIY SCORE: 23
WALKING IN HOSPITAL ROOM: A LITTLE
CLIMB 3 TO 5 STEPS WITH RAILING: A LITTLE

## 2025-06-26 ASSESSMENT — PAIN SCALES - GENERAL
PAINLEVEL_OUTOF10: 3
PAINLEVEL_OUTOF10: 1
PAINLEVEL_OUTOF10: 0 - NO PAIN
PAINLEVEL_OUTOF10: 6

## 2025-06-26 ASSESSMENT — PAIN - FUNCTIONAL ASSESSMENT
PAIN_FUNCTIONAL_ASSESSMENT: 0-10

## 2025-06-26 NOTE — DISCHARGE SUMMARY
Discharge Diagnosis  Acute appendicitis with localized peritonitis, without perforation, abscess, or gangrene    Issues Requiring Follow-Up  Post operative follow up with Dr. Mckeon    Test Results Pending At Discharge  Pending Labs       Order Current Status    Surgical Pathology Exam In process            Hospital Course   Ms. Priya Hoover is a 60 y/o female with no significant PMH who presented to the ED with acute abdominal pain and associated nausea and vomiting. She was found on CT imaging to have acute appendicitis and taken the OR for laparoscopic appendectomy on 6/24/25. She tolerated the procedure well but did have some spasms in the lower abdomen and ability to tolerate oral intake was minimal until evening of 6/25/25. Patient was ambulating well with normalization of WBC on 6/26/25. Her pain was well controlled and she tolerated multiple meals of a regular diet. She is medically stable for discharge home on 6/26/25 with follow up with Dr. Mckeon in 1 week.     Visit Vitals  BP 92/59 (BP Location: Left arm, Patient Position: Lying) Comment: RN notified   Pulse 58   Temp 36.2 °C (97.2 °F) (Temporal)   Resp 17     Vitals:    06/24/25 0959   Weight: 52.6 kg (116 lb)       Immunization History   Administered Date(s) Administered    Tdap vaccine, age 7 year and older (BOOSTRIX, ADACEL) 07/18/2019       Results        Pertinent Physical Exam At Time of Discharge  Physical Exam  Constitutional:       Appearance: Normal appearance.   HENT:      Head: Normocephalic and atraumatic.      Mouth/Throat:      Mouth: Mucous membranes are moist.   Eyes:      General: No scleral icterus.     Extraocular Movements: Extraocular movements intact.   Cardiovascular:      Rate and Rhythm: Normal rate.   Pulmonary:      Effort: Pulmonary effort is normal. No respiratory distress.   Abdominal:      General: Abdomen is flat. There is no distension.      Palpations: Abdomen is soft.      Tenderness: There is abdominal  H&P- [employer] 1978, 36 y o  male MRN: [id number]    Unit/Bed#: E5 -01 Encounter: [id number]    Primary Care Provider: Ngoc Chavira   Date and time admitted to hospital: 12/22/2018 10:06 AM  History and Physical - [facility]    Patient Information: [employer] 36 y o  male MRN: [id number]  Unit/Bed#: E5 -01 Encounter: [id number]  Admitting Physician: Tangela Benavides PA-C  PCP: Ngoc Chavira  Date of Admission:  12/22/18    Assessment/Plan:    Hospital Problem List:     Principal Problem:    Sepsis [facility])  Active Problems: Moderate asthma with exacerbation    HIV (human immunodeficiency virus infection) ([facility] [address] )          * [facility] )   Assessment & Plan    2* URI w/asthma exacerbation vs bronchitis  In pt w/HIV  By tachycardia tachypnea with initial lactic acid 2 2 now 1 8  CXR negative for pneumonia  Received Levaquin in the ED Will continue for now check procalcitonin as well as influenza by pcr  Follow-up blood cultures monitor vital signs for fever curve     Moderate asthma with exacerbation   Assessment & Plan    Likely secondary viral URI versus bronchitis    Consider atypical etiology given HIV  Patient room air with good respiratory excursion but diffuse expiratory polyphonic wheezes  Patient maintained on Qvar and albuterol inhaler with acute exacerbation  Continue IV solumedrol 40mg q 8 h, RTC updraft nebulizer therapies       HIV (human immunodeficiency virus infection) ([facility] )   Assessment & Plan    Followed by ID in [employer]  Just started on [place] has been compliant w/meds  Reports 2 mo ago cd4 was 81406, unable to recall viral load  F/u cd4 and viral load draw, if found to have AIDS consider ID consultation and possible IRS           VTE Prophylaxis: Enoxaparin (Lovenox)  / sequential compression device   Code Status: fc  POLST: There is no POLST form on file for this patient (pre-hospital)    Anticipated Length of Stay:  Patient will be admitted on an Inpatient basis with an anticipated length of stay of  Greater than 2 midnights  Justification for Hospital Stay: asthma w/acute exacerbation, hiv    Total Time for Visit, including Counseling / Coordination of Care: 45 minutes  Greater than 50% of this total time spent on direct patient counseling and coordination of care  Chief Complaint:   Sob/ cough/ congestion for 5 days    History of Present Illness:    Elias Aguirre is a 36 y o  male who presents with PMH of HIV, asthma, current quarter pack per day smoker coming for shortness of breath cough sinus congestion last days  Patient reports that the week prior to admission he received a Gardasil injection  He also was started on Biktarvy from his ID doctor in [facility] where he lived up until 08/2018 when he moved up to the [place]  He reports that starting Tuesday while he is at work he started developed sinus congestion, postnasal drip, sore throat, cough productive of white/tan sputum, with worsening shortness of breath  The patient reports he normally takes Qvar and albuterol for his asthma  He is being started on new medication but he does not recall the name of this  He reports over the last 2 days his sob started to ramp up where he was having dyspnea on exertion lasting upward to 10 minutes  The day prior to admission he had pnd  He has myalgias, and chills, ha and nausea  No known sick contacts  No travel in last 3-6 mo  His last cd4 ct was 48480 by his recall and does not remember what his viral load was  He is compliant with his ART  He has been hospitalized 2x/in last year for asthma  Review of Systems:    Review of Systems   Constitutional: Positive for chills  Negative for fever  HENT: Positive for congestion and sneezing  Respiratory: Positive for cough and shortness of breath  Musculoskeletal: Positive for myalgias  Neurological: Positive for headaches  All other systems reviewed and are negative        Past tenderness. There is no guarding or rebound.      Comments: Appropriate tenderness to palpation postoperatively. Three inciscions covered with gauze and tegederm with minimal drainage.    Musculoskeletal:         General: Normal range of motion.      Cervical back: Normal range of motion.   Skin:     General: Skin is warm and dry.      Capillary Refill: Capillary refill takes less than 2 seconds.   Neurological:      General: No focal deficit present.      Mental Status: She is alert and oriented to person, place, and time.   Psychiatric:         Mood and Affect: Mood normal.         Behavior: Behavior normal.         Home Medications     Medication List      START taking these medications     oxyCODONE-acetaminophen 5-325 mg tablet; Commonly known as: Percocet;   Take 1 tablet by mouth every 6 hours if needed for severe pain (7 - 10)   for up to 3 days.   polyethylene glycol 17 gram packet; Commonly known as: Glycolax,   Miralax; Take 17 g by mouth once daily for 7 days.       Outpatient Follow-Up  No future appointments.    Ladarius Nuñez MD   Medical and Surgical History:     Past Medical History:   Diagnosis Date    Asthma     HIV (human immunodeficiency virus infection) ([facility] [address] )        History reviewed  No pertinent surgical history  Meds/Allergies:    Prior to Admission medications    Medication Sig Start Date End Date Taking? Authorizing Provider   albuterol (PROAIR HFA) 90 mcg/act inhaler Inhale 2 puffs every 4 (four) hours as needed for wheezing 12/20/18  Yes Tawanda Lopez MD   predniSONE 20 mg tablet Take 3 tablets (60 mg total) by mouth daily for 5 days 12/20/18 12/25/18 Yes Tawanda Lopez MD   Zidovudine (RETROVIR PO) Take by mouth daily   Yes Historical Provider, MD     I have reviewed home medications with patient personally  Allergies: Allergies   Allergen Reactions    Penicillins Itching       Social History:     Marital Status: Single   Occupation:   Patient Pre-hospital Living Situation:   Patient Pre-hospital Level of Mobility:   Patient Pre-hospital Diet Restrictions:   Substance Use History:   History   Alcohol Use No     History   Smoking Status    Current Every Day Smoker    Types: Cigarettes   Smokeless Tobacco    Never Used     History   Drug Use No       Family History:    History reviewed  No pertinent family history  Physical Exam:     Vitals:   Blood Pressure: 148/80 (12/22/18 1245)  Pulse: 94 (12/22/18 1245)  Temperature: 98 7 °F (37 1 °C) (12/22/18 1245)  Temp Source: Temporal (12/22/18 1245)  Respirations: 20 (12/22/18 1245)  Weight - Scale: 110 kg (241 lb 10 oz) (12/22/18 1010)  SpO2: 95 % (12/22/18 1245)    Physical Exam   Constitutional: He appears well-developed  No distress  Appears obese, appears ill   HENT:   Head: Normocephalic and atraumatic  Right Ear: External ear normal    No posterior pharyngeal exudate or erythema no tonsillar exudate   Eyes: Conjunctivae are normal  Right eye exhibits no discharge  Left eye exhibits no discharge  No scleral icterus     Cardiovascular: Normal rate and regular rhythm  Exam reveals no gallop and no friction rub  No murmur heard  Pulmonary/Chest: No respiratory distress  He has wheezes  He has no rales  Patient on room air with minimal conversational dyspnea and semi recumbent position    Does demonstrate diffuse polyphonic expiratory wheezing on anterior and posterior auscultation   Abdominal: He exhibits no distension  There is no tenderness  There is no rebound and no guarding  Musculoskeletal: He exhibits no edema  Lymphadenopathy:     He has no cervical adenopathy  Neurological: He is alert  Skin: Skin is warm and dry  He is not diaphoretic  Psychiatric: He has a normal mood and affect  Vitals reviewed  (  Be Sure to Include Physical Exam: Delete this entire line when you have entered your exam)    Additional Data:     Lab Results: I have personally reviewed pertinent reports  Results from last 7 days  Lab Units 12/22/18  1033   WBC Thousand/uL 9 38   HEMOGLOBIN g/dL 16 0   HEMATOCRIT % 50 4*   PLATELETS Thousands/uL 258   NEUTROS PCT % 78*   LYMPHS PCT % 14   MONOS PCT % 7   EOS PCT % 0       Results from last 7 days  Lab Units 12/22/18  1033   POTASSIUM mmol/L 3 9   CHLORIDE mmol/L 103   CO2 mmol/L 25   BUN mg/dL 15   CREATININE mg/dL 1 22   CALCIUM mg/dL 9 7           Imaging: I have personally reviewed pertinent reports  Xr Chest 2 Views    Result Date: 12/22/2018  Narrative: CHEST INDICATION:   cough  Shortness of breath, asthma COMPARISON:  12/20/2018 chest x-ray EXAM PERFORMED/VIEWS:  XR CHEST PA & LATERAL Images: 4, dual-energy FINDINGS: Cardiomediastinal silhouette appears unremarkable  The lungs are clear  No pneumothorax or pleural effusion  Osseous structures appear within normal limits for patient age  Impression: No acute cardiopulmonary disease  Stable exam Workstation performed: [id number]     Xr Chest 2 Views    Result Date: 12/20/2018  Narrative: CHEST INDICATION:   Shortness of breath  2 days of cough  COMPARISON:  9/4/2018 EXAM PERFORMED/VIEWS:  XR CHEST PA & LATERAL  The frontal view was performed utilizing dual energy radiographic technique  Images: 4 FINDINGS: Cardiomediastinal silhouette appears unremarkable  The lungs are clear  No pneumothorax or pleural effusion  Osseous structures appear within normal limits for patient age  Impression: No acute cardiopulmonary disease  Workstation performed: [id number]       EKG, Pathology, and Other Studies Reviewed on Admission:   · EKG:     Allscripts / Epic Records Reviewed: Yes     ** Please Note: This note has been constructed using a voice recognition system   **

## 2025-06-26 NOTE — NURSING NOTE
Patient discharged to home. Discharge instructions were reviewed with the patient and her  who was at the bedside. Understanding was verbalized. Patient's IV access was removed and she left with all of her belongings.

## 2025-06-26 NOTE — DISCHARGE INSTRUCTIONS
Discharge Instructions    Incisions  Your incisions are covered with steri-strips (the small white strips). You may shower with the steri-strips on. They will fall off on their own in 1-2 weeks.  You may take a shower or bath starting 6/26/25. May leave dressing in place and water run over top. When removed, let soapy water run over incisions and pat dry.   Keep the incisions clean and dry.   You may (but do not have to) cover the incisions with gauze and tape.       Pain  Do not drive while taking stronger pain medications (Tramadol, Percocet, Norco, Oxycodone, etc).   You may drive once you feel comfortable without stronger pain medications and can drive without any significant distractions related to your pain or surgical sites.  You may alternate acetaminophen and ibuprofen for pain control, as long as you are able to take either medication.   If you are prescribed a pain patch, each patch is to stay on the skin for 12 hours, then off for 12 hours at a time. You may cut the patch in half to place on either side of the area of concern.    Diet  Resume your normal diet. Your appetite may not fully return immediately.   Please drink plenty of fluids to maintain hydration.    Physical Activity  Limit physical activity that would involve stretching the incision(s) for 2 weeks.  No lifting over 10-15 lb or strenuous physical activity for at least 3 weeks after surgery. If there is pain with increased physical effort, please resume light duty restrictions.     Call Provider  If you are experiencing fever (100.4F or higher).  If you are experiencing significantly worsening pain, nausea or vomiting.  If the incision(s) appear reddened, swollen or are draining.  If you have very loose or watery bowel movements.  If you have any new concerning symptoms.

## 2025-06-26 NOTE — CARE PLAN
Problem: Pain - Adult  Goal: Verbalizes/displays adequate comfort level or baseline comfort level  Outcome: Progressing     Problem: Safety - Adult  Goal: Free from fall injury  Outcome: Progressing     Problem: Discharge Planning  Goal: Discharge to home or other facility with appropriate resources  Outcome: Progressing     Problem: Chronic Conditions and Co-morbidities  Goal: Patient's chronic conditions and co-morbidity symptoms are monitored and maintained or improved  Outcome: Progressing     Problem: Nutrition  Goal: Nutrient intake appropriate for maintaining nutritional needs  Outcome: Progressing     Problem: Fall/Injury  Goal: Not fall by end of shift  Outcome: Progressing  Goal: Be free from injury by end of the shift  Outcome: Progressing  Goal: Verbalize understanding of personal risk factors for fall in the hospital  Outcome: Progressing  Goal: Verbalize understanding of risk factor reduction measures to prevent injury from fall in the home  Outcome: Progressing  Goal: Use assistive devices by end of the shift  Outcome: Progressing  Goal: Pace activities to prevent fatigue by end of the shift  Outcome: Progressing     Problem: Skin  Goal: Promote/optimize nutrition  Outcome: Progressing  Goal: Promote skin healing  Outcome: Progressing       The patient's goals for the shift include      The clinical goals for the shift include patient will remain safe and hemodynamically stable throughout the shift.

## 2025-06-27 ENCOUNTER — PATIENT OUTREACH (OUTPATIENT)
Dept: PRIMARY CARE | Facility: CLINIC | Age: 60
End: 2025-06-27
Payer: COMMERCIAL

## 2025-06-27 NOTE — PROGRESS NOTES
Discharge Facility: [facility]  Discharge Diagnosis: Acute appendicitis with localized peritonitis, without perforation, abscess, or gangrene  Admission Date: 24 June 25  Discharge Date: 26 June 25    PCP Appointment Date: Declined  Specialist Appointment Date: Pt calling Surgery today to make follow up appt  Hospital Encounter and Summary Linked: Yes  ED to Hosp-Admission (Discharged) with Trinidad Mckeon MD; Jaron Michael DO (06/24/2025)     See discharge assessment below for further details     Wrap Up  Wrap Up Additional Comments: Pt stating she is doing well since her discharge. pt able to obtain all medications without difficulty. pt has no questions regarding medications or discharge instructions at this time. pt not wishing to follow with PCP at this time and will call to make follow up appt with Gen Surg later on today. (6/27/2025 10:54 AM)  Call End Time: 1054 (6/27/2025 10:54 AM)    Engagement  Call Start Time: 1044 (Spoke with patient) (6/27/2025 10:54 AM)    Medications  Medications reviewed with patient/caregiver?: Yes (6/27/2025 10:54 AM)  Is the patient having any side effects they believe may be caused by any medication additions or changes?: No (6/27/2025 10:54 AM)  Does the patient have all medications ordered at discharge?: Yes (6/27/2025 10:54 AM)  Prescription Comments: all newly prescribed meds obtained without difficulty (6/27/2025 10:54 AM)  Is the patient taking all medications as directed (includes completed medication regime)?: Yes (6/27/2025 10:54 AM)  Medication Comments: no questions on medications (6/27/2025 10:54 AM)    Appointments  Does the patient have a primary care provider?: Yes (Declined) (6/27/2025 10:54 AM)  Has the patient kept scheduled appointments due by today?: Yes (6/27/2025 10:54 AM)    Self Management  What is the home health agency?: None (6/27/2025 10:54 AM)  Has home health visited the patient within 72 hours of discharge?: Not applicable (6/27/2025 10:54 AM)  What  Durable Medical Equipment (DME) was ordered?: None (6/27/2025 10:54 AM)    Patient Teaching  Does the patient have access to their discharge instructions?: Yes (6/27/2025 10:54 AM)  Care Management Interventions: Reviewed instructions with patient (6/27/2025 10:54 AM)  What is the patient's perception of their health status since discharge?: Improving (6/27/2025 10:54 AM)  Patient/Caregiver Education Comments: None (6/27/2025 10:54 AM)

## 2025-07-16 LAB
LABORATORY COMMENT REPORT: NORMAL
PATH REPORT.FINAL DX SPEC: NORMAL
PATH REPORT.GROSS SPEC: NORMAL
PATH REPORT.MICROSCOPIC SPEC OTHER STN: NORMAL
PATH REPORT.RELEVANT HX SPEC: NORMAL
PATH REPORT.TOTAL CANCER: NORMAL

## 2025-07-18 LAB
ATRIAL RATE: 76 BPM
P AXIS: 60 DEGREES
PR INTERVAL: 148 MS
Q ONSET: 252 MS
QRS COUNT: 12 BEATS
QRS DURATION: 107 MS
QT INTERVAL: 388 MS
QTC CALCULATION(BAZETT): 437 MS
QTC FREDERICIA: 419 MS
R AXIS: 72 DEGREES
T AXIS: 49 DEGREES
T OFFSET: 446 MS
VENTRICULAR RATE: 76 BPM

## 2025-08-17 ENCOUNTER — HOSPITAL ENCOUNTER (EMERGENCY)
Facility: HOSPITAL | Age: 60
Discharge: HOME | End: 2025-08-17
Attending: EMERGENCY MEDICINE

## 2025-08-17 ENCOUNTER — HOSPITAL ENCOUNTER (EMERGENCY)
Facility: HOSPITAL | Age: 60
Discharge: HOME | End: 2025-08-17

## 2025-08-17 ENCOUNTER — APPOINTMENT (OUTPATIENT)
Dept: RADIOLOGY | Facility: HOSPITAL | Age: 60
End: 2025-08-17

## 2025-08-17 ENCOUNTER — APPOINTMENT (OUTPATIENT)
Dept: CARDIOLOGY | Facility: HOSPITAL | Age: 60
End: 2025-08-17

## 2025-08-17 VITALS
HEART RATE: 60 BPM | TEMPERATURE: 97.5 F | WEIGHT: 115 LBS | DIASTOLIC BLOOD PRESSURE: 70 MMHG | SYSTOLIC BLOOD PRESSURE: 118 MMHG | RESPIRATION RATE: 16 BRPM | BODY MASS INDEX: 23.18 KG/M2 | HEIGHT: 59 IN | OXYGEN SATURATION: 100 %

## 2025-08-17 VITALS
WEIGHT: 115 LBS | HEART RATE: 85 BPM | SYSTOLIC BLOOD PRESSURE: 143 MMHG | OXYGEN SATURATION: 99 % | BODY MASS INDEX: 23.18 KG/M2 | DIASTOLIC BLOOD PRESSURE: 85 MMHG | HEIGHT: 59 IN | RESPIRATION RATE: 16 BRPM | TEMPERATURE: 97.9 F

## 2025-08-17 DIAGNOSIS — R10.10 PAIN OF UPPER ABDOMEN: Primary | ICD-10-CM

## 2025-08-17 DIAGNOSIS — R10.84 GENERALIZED ABDOMINAL PAIN: Primary | ICD-10-CM

## 2025-08-17 LAB
ALBUMIN SERPL BCP-MCNC: 4.4 G/DL (ref 3.4–5)
ALBUMIN SERPL BCP-MCNC: 4.6 G/DL (ref 3.4–5)
ALP SERPL-CCNC: 61 U/L (ref 33–110)
ALP SERPL-CCNC: 61 U/L (ref 33–110)
ALT SERPL W P-5'-P-CCNC: 27 U/L (ref 7–45)
ALT SERPL W P-5'-P-CCNC: 31 U/L (ref 7–45)
ANION GAP SERPL CALC-SCNC: 12 MMOL/L (ref 10–20)
ANION GAP SERPL CALC-SCNC: 13 MMOL/L (ref 10–20)
APPEARANCE UR: CLEAR
AST SERPL W P-5'-P-CCNC: 20 U/L (ref 9–39)
AST SERPL W P-5'-P-CCNC: 27 U/L (ref 9–39)
BASOPHILS # BLD AUTO: 0.01 X10*3/UL (ref 0–0.1)
BASOPHILS # BLD AUTO: 0.03 X10*3/UL (ref 0–0.1)
BASOPHILS NFR BLD AUTO: 0.1 %
BASOPHILS NFR BLD AUTO: 0.4 %
BILIRUB SERPL-MCNC: 0.7 MG/DL (ref 0–1.2)
BILIRUB SERPL-MCNC: 1 MG/DL (ref 0–1.2)
BILIRUB UR STRIP.AUTO-MCNC: NEGATIVE MG/DL
BUN SERPL-MCNC: 16 MG/DL (ref 6–23)
BUN SERPL-MCNC: 17 MG/DL (ref 6–23)
CALCIUM SERPL-MCNC: 9.2 MG/DL (ref 8.6–10.3)
CALCIUM SERPL-MCNC: 9.5 MG/DL (ref 8.6–10.3)
CARDIAC TROPONIN I PNL SERPL HS: <3 NG/L (ref 0–13)
CHLORIDE SERPL-SCNC: 103 MMOL/L (ref 98–107)
CHLORIDE SERPL-SCNC: 104 MMOL/L (ref 98–107)
CO2 SERPL-SCNC: 26 MMOL/L (ref 21–32)
CO2 SERPL-SCNC: 29 MMOL/L (ref 21–32)
COLOR UR: YELLOW
CREAT SERPL-MCNC: 0.78 MG/DL (ref 0.5–1.05)
CREAT SERPL-MCNC: 0.79 MG/DL (ref 0.5–1.05)
EGFRCR SERPLBLD CKD-EPI 2021: 86 ML/MIN/1.73M*2
EGFRCR SERPLBLD CKD-EPI 2021: 88 ML/MIN/1.73M*2
EOSINOPHIL # BLD AUTO: 0.01 X10*3/UL (ref 0–0.7)
EOSINOPHIL # BLD AUTO: 0.14 X10*3/UL (ref 0–0.7)
EOSINOPHIL NFR BLD AUTO: 0.1 %
EOSINOPHIL NFR BLD AUTO: 1.7 %
ERYTHROCYTE [DISTWIDTH] IN BLOOD BY AUTOMATED COUNT: 12.5 % (ref 11.5–14.5)
ERYTHROCYTE [DISTWIDTH] IN BLOOD BY AUTOMATED COUNT: 12.5 % (ref 11.5–14.5)
GLUCOSE SERPL-MCNC: 103 MG/DL (ref 74–99)
GLUCOSE SERPL-MCNC: 133 MG/DL (ref 74–99)
GLUCOSE UR STRIP.AUTO-MCNC: NEGATIVE MG/DL
HCT VFR BLD AUTO: 41.7 % (ref 36–46)
HCT VFR BLD AUTO: 44.9 % (ref 36–46)
HGB BLD-MCNC: 13.1 G/DL (ref 12–16)
HGB BLD-MCNC: 14.7 G/DL (ref 12–16)
IMM GRANULOCYTES # BLD AUTO: 0.02 X10*3/UL (ref 0–0.7)
IMM GRANULOCYTES # BLD AUTO: 0.03 X10*3/UL (ref 0–0.7)
IMM GRANULOCYTES NFR BLD AUTO: 0.2 % (ref 0–0.9)
IMM GRANULOCYTES NFR BLD AUTO: 0.3 % (ref 0–0.9)
KETONES UR STRIP.AUTO-MCNC: NEGATIVE MG/DL
LACTATE SERPL-SCNC: 1.1 MMOL/L (ref 0.4–2)
LACTATE SERPL-SCNC: 1.2 MMOL/L (ref 0.4–2)
LEUKOCYTE ESTERASE UR QL STRIP.AUTO: NEGATIVE
LIPASE SERPL-CCNC: 11 U/L (ref 9–82)
LIPASE SERPL-CCNC: 19 U/L (ref 9–82)
LYMPHOCYTES # BLD AUTO: 1.45 X10*3/UL (ref 1.2–4.8)
LYMPHOCYTES # BLD AUTO: 2.67 X10*3/UL (ref 1.2–4.8)
LYMPHOCYTES NFR BLD AUTO: 14.4 %
LYMPHOCYTES NFR BLD AUTO: 32.2 %
MCH RBC QN AUTO: 28.5 PG (ref 26–34)
MCH RBC QN AUTO: 29.2 PG (ref 26–34)
MCHC RBC AUTO-ENTMCNC: 31.4 G/DL (ref 32–36)
MCHC RBC AUTO-ENTMCNC: 32.7 G/DL (ref 32–36)
MCV RBC AUTO: 89 FL (ref 80–100)
MCV RBC AUTO: 91 FL (ref 80–100)
MONOCYTES # BLD AUTO: 0.62 X10*3/UL (ref 0.1–1)
MONOCYTES # BLD AUTO: 0.71 X10*3/UL (ref 0.1–1)
MONOCYTES NFR BLD AUTO: 6.1 %
MONOCYTES NFR BLD AUTO: 8.6 %
MUCOUS THREADS #/AREA URNS AUTO: NORMAL /LPF
NEUTROPHILS # BLD AUTO: 4.73 X10*3/UL (ref 1.2–7.7)
NEUTROPHILS # BLD AUTO: 7.97 X10*3/UL (ref 1.2–7.7)
NEUTROPHILS NFR BLD AUTO: 56.9 %
NEUTROPHILS NFR BLD AUTO: 79 %
NITRITE UR QL STRIP.AUTO: NEGATIVE
NRBC BLD-RTO: 0 /100 WBCS (ref 0–0)
NRBC BLD-RTO: 0 /100 WBCS (ref 0–0)
PH UR STRIP.AUTO: 6.5 [PH]
PLATELET # BLD AUTO: 261 X10*3/UL (ref 150–450)
PLATELET # BLD AUTO: 300 X10*3/UL (ref 150–450)
POTASSIUM SERPL-SCNC: 3.8 MMOL/L (ref 3.5–5.3)
POTASSIUM SERPL-SCNC: 4.8 MMOL/L (ref 3.5–5.3)
PROT SERPL-MCNC: 7.1 G/DL (ref 6.4–8.2)
PROT SERPL-MCNC: 7.1 G/DL (ref 6.4–8.2)
PROT UR STRIP.AUTO-MCNC: NORMAL MG/DL
RBC # BLD AUTO: 4.59 X10*6/UL (ref 4–5.2)
RBC # BLD AUTO: 5.03 X10*6/UL (ref 4–5.2)
RBC # UR STRIP.AUTO: NEGATIVE MG/DL
RBC #/AREA URNS AUTO: NORMAL /HPF
SODIUM SERPL-SCNC: 137 MMOL/L (ref 136–145)
SODIUM SERPL-SCNC: 141 MMOL/L (ref 136–145)
SP GR UR STRIP.AUTO: 1.01
SQUAMOUS #/AREA URNS AUTO: NORMAL /HPF
UROBILINOGEN UR STRIP.AUTO-MCNC: NORMAL MG/DL
WBC # BLD AUTO: 10.1 X10*3/UL (ref 4.4–11.3)
WBC # BLD AUTO: 8.3 X10*3/UL (ref 4.4–11.3)
WBC #/AREA URNS AUTO: NORMAL /HPF

## 2025-08-17 PROCEDURE — 84484 ASSAY OF TROPONIN QUANT: CPT

## 2025-08-17 PROCEDURE — 96375 TX/PRO/DX INJ NEW DRUG ADDON: CPT

## 2025-08-17 PROCEDURE — 83690 ASSAY OF LIPASE: CPT

## 2025-08-17 PROCEDURE — 81001 URINALYSIS AUTO W/SCOPE: CPT | Performed by: EMERGENCY MEDICINE

## 2025-08-17 PROCEDURE — 83605 ASSAY OF LACTIC ACID: CPT

## 2025-08-17 PROCEDURE — 74018 RADEX ABDOMEN 1 VIEW: CPT | Performed by: RADIOLOGY

## 2025-08-17 PROCEDURE — 74177 CT ABD & PELVIS W/CONTRAST: CPT | Performed by: RADIOLOGY

## 2025-08-17 PROCEDURE — 2500000004 HC RX 250 GENERAL PHARMACY W/ HCPCS (ALT 636 FOR OP/ED)

## 2025-08-17 PROCEDURE — 85025 COMPLETE CBC W/AUTO DIFF WBC: CPT

## 2025-08-17 PROCEDURE — 74018 RADEX ABDOMEN 1 VIEW: CPT

## 2025-08-17 PROCEDURE — 36415 COLL VENOUS BLD VENIPUNCTURE: CPT | Performed by: EMERGENCY MEDICINE

## 2025-08-17 PROCEDURE — 99285 EMERGENCY DEPT VISIT HI MDM: CPT | Mod: 25 | Performed by: EMERGENCY MEDICINE

## 2025-08-17 PROCEDURE — 2500000004 HC RX 250 GENERAL PHARMACY W/ HCPCS (ALT 636 FOR OP/ED): Performed by: EMERGENCY MEDICINE

## 2025-08-17 PROCEDURE — 2500000001 HC RX 250 WO HCPCS SELF ADMINISTERED DRUGS (ALT 637 FOR MEDICARE OP)

## 2025-08-17 PROCEDURE — 99285 EMERGENCY DEPT VISIT HI MDM: CPT | Mod: 25

## 2025-08-17 PROCEDURE — 83690 ASSAY OF LIPASE: CPT | Performed by: EMERGENCY MEDICINE

## 2025-08-17 PROCEDURE — 96374 THER/PROPH/DIAG INJ IV PUSH: CPT | Mod: 59

## 2025-08-17 PROCEDURE — 96374 THER/PROPH/DIAG INJ IV PUSH: CPT

## 2025-08-17 PROCEDURE — 74177 CT ABD & PELVIS W/CONTRAST: CPT

## 2025-08-17 PROCEDURE — 83605 ASSAY OF LACTIC ACID: CPT | Performed by: EMERGENCY MEDICINE

## 2025-08-17 PROCEDURE — 85025 COMPLETE CBC W/AUTO DIFF WBC: CPT | Performed by: EMERGENCY MEDICINE

## 2025-08-17 PROCEDURE — 2550000001 HC RX 255 CONTRASTS: Performed by: EMERGENCY MEDICINE

## 2025-08-17 PROCEDURE — 80053 COMPREHEN METABOLIC PANEL: CPT | Performed by: EMERGENCY MEDICINE

## 2025-08-17 PROCEDURE — 80053 COMPREHEN METABOLIC PANEL: CPT

## 2025-08-17 PROCEDURE — 36415 COLL VENOUS BLD VENIPUNCTURE: CPT

## 2025-08-17 PROCEDURE — 93005 ELECTROCARDIOGRAM TRACING: CPT

## 2025-08-17 PROCEDURE — 96376 TX/PRO/DX INJ SAME DRUG ADON: CPT

## 2025-08-17 RX ORDER — KETOROLAC TROMETHAMINE 30 MG/ML
15 INJECTION, SOLUTION INTRAMUSCULAR; INTRAVENOUS ONCE
Status: COMPLETED | OUTPATIENT
Start: 2025-08-17 | End: 2025-08-17

## 2025-08-17 RX ORDER — POLYETHYLENE GLYCOL 3350 17 G/17G
17 POWDER, FOR SOLUTION ORAL ONCE
Status: COMPLETED | OUTPATIENT
Start: 2025-08-17 | End: 2025-08-17

## 2025-08-17 RX ORDER — ONDANSETRON HYDROCHLORIDE 2 MG/ML
INJECTION, SOLUTION INTRAVENOUS
Status: COMPLETED
Start: 2025-08-17 | End: 2025-08-17

## 2025-08-17 RX ORDER — MORPHINE SULFATE 4 MG/ML
INJECTION INTRAVENOUS
Status: COMPLETED
Start: 2025-08-17 | End: 2025-08-17

## 2025-08-17 RX ORDER — MORPHINE SULFATE 4 MG/ML
4 INJECTION INTRAVENOUS ONCE
Status: COMPLETED | OUTPATIENT
Start: 2025-08-17 | End: 2025-08-17

## 2025-08-17 RX ORDER — DICYCLOMINE HYDROCHLORIDE 10 MG/1
20 CAPSULE ORAL ONCE
Status: COMPLETED | OUTPATIENT
Start: 2025-08-17 | End: 2025-08-17

## 2025-08-17 RX ORDER — ONDANSETRON HYDROCHLORIDE 2 MG/ML
4 INJECTION, SOLUTION INTRAVENOUS ONCE
Status: COMPLETED | OUTPATIENT
Start: 2025-08-17 | End: 2025-08-17

## 2025-08-17 RX ADMIN — POLYETHYLENE GLYCOL 3350 17 G: 17 POWDER, FOR SOLUTION ORAL at 20:52

## 2025-08-17 RX ADMIN — MORPHINE SULFATE 4 MG: 4 INJECTION INTRAVENOUS at 04:42

## 2025-08-17 RX ADMIN — ONDANSETRON 4 MG: 2 INJECTION, SOLUTION INTRAMUSCULAR; INTRAVENOUS at 04:41

## 2025-08-17 RX ADMIN — DICYCLOMINE HYDROCHLORIDE 20 MG: 10 CAPSULE ORAL at 18:59

## 2025-08-17 RX ADMIN — KETOROLAC TROMETHAMINE 15 MG: 30 INJECTION, SOLUTION INTRAMUSCULAR at 07:31

## 2025-08-17 RX ADMIN — ONDANSETRON 4 MG: 2 INJECTION, SOLUTION INTRAMUSCULAR; INTRAVENOUS at 18:59

## 2025-08-17 RX ADMIN — ONDANSETRON HYDROCHLORIDE 4 MG: 2 INJECTION, SOLUTION INTRAVENOUS at 04:41

## 2025-08-17 RX ADMIN — MORPHINE SULFATE 4 MG: 4 INJECTION, SOLUTION INTRAMUSCULAR; INTRAVENOUS at 04:42

## 2025-08-17 RX ADMIN — MORPHINE SULFATE 4 MG: 4 INJECTION, SOLUTION INTRAMUSCULAR; INTRAVENOUS at 19:53

## 2025-08-17 RX ADMIN — IOHEXOL 75 ML: 350 INJECTION, SOLUTION INTRAVENOUS at 05:46

## 2025-08-17 RX ADMIN — MORPHINE SULFATE 4 MG: 4 INJECTION, SOLUTION INTRAMUSCULAR; INTRAVENOUS at 07:32

## 2025-08-17 ASSESSMENT — PAIN - FUNCTIONAL ASSESSMENT
PAIN_FUNCTIONAL_ASSESSMENT: 0-10
PAIN_FUNCTIONAL_ASSESSMENT: WONG-BAKER FACES
PAIN_FUNCTIONAL_ASSESSMENT: 0-10

## 2025-08-17 ASSESSMENT — PAIN DESCRIPTION - LOCATION
LOCATION: ABDOMEN

## 2025-08-17 ASSESSMENT — LIFESTYLE VARIABLES
HAVE PEOPLE ANNOYED YOU BY CRITICIZING YOUR DRINKING: NO
TOTAL SCORE: 0
EVER HAD A DRINK FIRST THING IN THE MORNING TO STEADY YOUR NERVES TO GET RID OF A HANGOVER: NO
TOTAL SCORE: 0
HAVE YOU EVER FELT YOU SHOULD CUT DOWN ON YOUR DRINKING: NO
EVER HAD A DRINK FIRST THING IN THE MORNING TO STEADY YOUR NERVES TO GET RID OF A HANGOVER: NO
EVER FELT BAD OR GUILTY ABOUT YOUR DRINKING: NO
HAVE YOU EVER FELT YOU SHOULD CUT DOWN ON YOUR DRINKING: NO
HAVE PEOPLE ANNOYED YOU BY CRITICIZING YOUR DRINKING: NO
EVER FELT BAD OR GUILTY ABOUT YOUR DRINKING: NO

## 2025-08-17 ASSESSMENT — PAIN SCALES - GENERAL
PAINLEVEL_OUTOF10: 4
PAINLEVEL_OUTOF10: 8
PAINLEVEL_OUTOF10: 10 - WORST POSSIBLE PAIN
PAINLEVEL_OUTOF10: 2
PAINLEVEL_OUTOF10: 4
PAINLEVEL_OUTOF10: 2
PAINLEVEL_OUTOF10: 8
PAINLEVEL_OUTOF10: 10 - WORST POSSIBLE PAIN
PAINLEVEL_OUTOF10: 7

## 2025-08-17 ASSESSMENT — PAIN DESCRIPTION - ORIENTATION: ORIENTATION: LOWER

## 2025-08-17 ASSESSMENT — PAIN DESCRIPTION - PAIN TYPE
TYPE: ACUTE PAIN
TYPE: ACUTE PAIN

## 2025-08-17 ASSESSMENT — PAIN DESCRIPTION - PROGRESSION: CLINICAL_PROGRESSION: GRADUALLY IMPROVING

## 2025-08-17 ASSESSMENT — PAIN SCALES - WONG BAKER: WONGBAKER_NUMERICALRESPONSE: HURTS EVEN MORE

## 2025-08-18 LAB — HOLD SPECIMEN: NORMAL

## 2025-08-19 LAB
ATRIAL RATE: 86 BPM
P AXIS: 62 DEGREES
PR INTERVAL: 131 MS
Q ONSET: 253 MS
QRS COUNT: 14 BEATS
QRS DURATION: 101 MS
QT INTERVAL: 357 MS
QTC CALCULATION(BAZETT): 425 MS
QTC FREDERICIA: 401 MS
R AXIS: 64 DEGREES
T AXIS: 46 DEGREES
T OFFSET: 431 MS
VENTRICULAR RATE: 85 BPM

## 2025-08-25 LAB
ATRIAL RATE: 86 BPM
P AXIS: 62 DEGREES
PR INTERVAL: 131 MS
Q ONSET: 253 MS
QRS COUNT: 14 BEATS
QRS DURATION: 101 MS
QT INTERVAL: 357 MS
QTC CALCULATION(BAZETT): 425 MS
QTC FREDERICIA: 401 MS
R AXIS: 64 DEGREES
T AXIS: 46 DEGREES
T OFFSET: 431 MS
VENTRICULAR RATE: 85 BPM

## (undated) DEVICE — PREP, SCRUB, SKIN, FOAM, HIBICLENS, 4 OZ

## (undated) DEVICE — SLEEVE, KII, Z-THREAD, 5X100CM

## (undated) DEVICE — SUTURE, VICRYL, 4-0, 18 IN, UNDYED BR PS-2

## (undated) DEVICE — SCOPE WARMER, LAPAROSCOPE, BAG ONLY, LF

## (undated) DEVICE — PAD, GROUNDING, ELECTROSURGICAL, W/9 FT CABLE, POLYHESIVE II, ADULT, LF

## (undated) DEVICE — TROCAR, BLUNT TIP, KII, W/SEAL, 12MM X 100MM

## (undated) DEVICE — CABLE, ELECTROSURGICAL, MONOPOLAR, LAPAROSCOPIC, 10 FT, LF

## (undated) DEVICE — CAUTERY, PENCIL, PUSH BUTTON, SMOKE EVAC, 70MM

## (undated) DEVICE — CUTTER, LINEAR FLEX ARTICNG 45MM, NO LOAD

## (undated) DEVICE — ASSEMBLY, STRYKER FLOW 2, SUCTION IRRIGATOR, WITH TIP

## (undated) DEVICE — RELOAD, LINEAR, 45MM, BLUE, REG TISSUE

## (undated) DEVICE — ENDO BABCOCKS, 5MM

## (undated) DEVICE — RETRIEVAL SYSTEM, MONARCH, 10MM DISP ENDOSCOPIC

## (undated) DEVICE — Device

## (undated) DEVICE — SOLUTION, IRRIGATION, SODIUM CHLORIDE 0.9%, 1000 ML, POUR BOTTLE

## (undated) DEVICE — GLOVE, SURGICAL, PROTEXIS PI BLUE W/NEUTHERA, 7.5, PF, LF

## (undated) DEVICE — COVER HANDLE LIGHT, STERIS, BLUE, STERILE

## (undated) DEVICE — SUTURE, VICRYL PLUS, 0, 27IN, UR-6, VIOLET, BRAIDED

## (undated) DEVICE — RELOAD, LINEAR, 45MM, WHITE

## (undated) DEVICE — DRESSING, GAUZE, SPONGE, 8 PLY, CURITY, 2 X 2 IN, STERILE

## (undated) DEVICE — PREP TRAY, SKIN, DRY, W/GLOVES